# Patient Record
Sex: MALE | Race: WHITE | Employment: FULL TIME | URBAN - METROPOLITAN AREA
[De-identification: names, ages, dates, MRNs, and addresses within clinical notes are randomized per-mention and may not be internally consistent; named-entity substitution may affect disease eponyms.]

---

## 2022-07-08 ENCOUNTER — OFFICE VISIT (OUTPATIENT)
Dept: INTERNAL MEDICINE CLINIC | Age: 52
End: 2022-07-08
Payer: COMMERCIAL

## 2022-07-08 VITALS
SYSTOLIC BLOOD PRESSURE: 145 MMHG | BODY MASS INDEX: 30.19 KG/M2 | OXYGEN SATURATION: 97 % | HEIGHT: 73 IN | WEIGHT: 227.8 LBS | HEART RATE: 72 BPM | DIASTOLIC BLOOD PRESSURE: 81 MMHG | RESPIRATION RATE: 12 BRPM | TEMPERATURE: 98.1 F

## 2022-07-08 DIAGNOSIS — Z87.442 HISTORY OF NEPHROLITHIASIS: ICD-10-CM

## 2022-07-08 DIAGNOSIS — R16.1 SPLENOMEGALY: ICD-10-CM

## 2022-07-08 DIAGNOSIS — E04.1 THYROID NODULE: ICD-10-CM

## 2022-07-08 DIAGNOSIS — Z00.00 ENCOUNTER FOR MEDICAL EXAMINATION TO ESTABLISH CARE: Primary | ICD-10-CM

## 2022-07-08 DIAGNOSIS — E78.5 HYPERLIPIDEMIA, UNSPECIFIED HYPERLIPIDEMIA TYPE: ICD-10-CM

## 2022-07-08 DIAGNOSIS — R90.89 ABNORMAL FINDING ON MRI OF BRAIN: ICD-10-CM

## 2022-07-08 DIAGNOSIS — L97.911 CHRONIC ULCER OF RIGHT LEG, LIMITED TO BREAKDOWN OF SKIN (HCC): ICD-10-CM

## 2022-07-08 DIAGNOSIS — I10 HYPERTENSION, UNSPECIFIED TYPE: ICD-10-CM

## 2022-07-08 DIAGNOSIS — Z85.47 HISTORY OF TESTICULAR CANCER: ICD-10-CM

## 2022-07-08 DIAGNOSIS — R53.83 FATIGUE, UNSPECIFIED TYPE: ICD-10-CM

## 2022-07-08 PROBLEM — R09.89 POOR CIRCULATION OF EXTREMITY: Status: ACTIVE | Noted: 2020-07-22

## 2022-07-08 PROBLEM — R60.0 LOCALIZED EDEMA: Status: ACTIVE | Noted: 2020-07-22

## 2022-07-08 PROBLEM — D64.9 ANEMIA, UNSPECIFIED: Status: ACTIVE | Noted: 2020-07-27

## 2022-07-08 PROCEDURE — 99204 OFFICE O/P NEW MOD 45 MIN: CPT | Performed by: INTERNAL MEDICINE

## 2022-07-08 RX ORDER — OLMESARTAN MEDOXOMIL 40 MG/1
40 TABLET ORAL DAILY
Qty: 90 TABLET | Refills: 1 | Status: SHIPPED | OUTPATIENT
Start: 2022-07-08

## 2022-07-08 RX ORDER — SIMVASTATIN 20 MG/1
20 TABLET, FILM COATED ORAL DAILY
Qty: 90 TABLET | Refills: 1 | Status: SHIPPED | OUTPATIENT
Start: 2022-07-08

## 2022-07-08 RX ORDER — TRIAMCINOLONE ACETONIDE 0.25 MG/G
OINTMENT TOPICAL
COMMUNITY
Start: 2022-05-18 | End: 2022-08-30 | Stop reason: SDUPTHER

## 2022-07-08 RX ORDER — CARVEDILOL 12.5 MG/1
12.5 TABLET ORAL 2 TIMES DAILY
COMMUNITY
Start: 2022-06-28 | End: 2022-07-08 | Stop reason: SDUPTHER

## 2022-07-08 RX ORDER — CARVEDILOL 12.5 MG/1
12.5 TABLET ORAL 2 TIMES DAILY
Qty: 180 TABLET | Refills: 1 | Status: SHIPPED | OUTPATIENT
Start: 2022-07-08

## 2022-07-08 RX ORDER — SIMVASTATIN 20 MG/1
1 TABLET, FILM COATED ORAL DAILY
COMMUNITY
Start: 2021-12-07 | End: 2022-07-08 | Stop reason: SDUPTHER

## 2022-07-08 RX ORDER — OLMESARTAN MEDOXOMIL 40 MG/1
40 TABLET ORAL DAILY
COMMUNITY
Start: 2022-04-30 | End: 2022-07-08 | Stop reason: SDUPTHER

## 2022-07-08 NOTE — PROGRESS NOTES
Adelaide Ospina is a 47 y.o. male who presents today for Establish Care (RM19// pt presents today to establish care moved here from [place] in April 2022 previous pcp Dr. Collin Constantino at [facility] [address] [place] [address] [facility])  . He has a history of There is no problem list on file for this patient. .    Today patient is here to establish care. Previous PCP [place], Dr. Skip Rojas. he is switching because moved here. Records are available for me to review. Ulcer to R LE. Had issues with venous issues on L leg in the past.  Reports that this is due to venous issues. He did have to have venous intervention in his left leg. Denies any trauma to the area    Does have a history of left testicular cancer status post orchiectomy in his late 25s. Did receive chemotherapy after. HTN: Has been stable in the past. A bit high today. Did have to come off chlorthalidone due to low blood pressures in the past.  Urged him to monitor this at home    Splenomegaly: Patient had a repeat CT scan done in March of this year which showed his spleen at 15.8 cm in length. This was almost 1 cm more than previous. This was initially noted due to a CT scan that was done as well as mild but chronic anemia. They have suggested that his previous history of chemotherapy may be contributing to this. He never did have a bone marrow biopsy. Was followed by hematology previously. SPEP was normal haptoglobin was normal, LD was just above normal at 379 kidney function normal hemoglobin was slightly low and platelets was just above normal.  Hemoglobin has been just above 11 for what appears to be the last couple years. Having some mild and general fatigue. Thyroid normal in the fall.      Has had an MRI of his brain and eyes due to blurry vision that was new in onset which did show some mild white cerebral matter disease and some microvascular changes which was concerning for potential sequelae of migraine headaches versus postinfectious process versus demyelinating disease. Patient reports that he was told that this was likely normal for his age and history of hyperlipidemia. Still having some issues with right lower visual field. Patient does have a right-sided thyroid nodule which was stable in October and a left-sided thyroid nodule. These have been stable. Social: [facility]. [facility] in [place]. Now in [place]. Family moved here from [place]. Two children, daughters. At home with family. No tobacco for 18yrs. EtOH social.     From [place]. Family History: reviewed    ROS  Review of Systems   Constitutional: Positive for malaise/fatigue. Negative for chills, fever and weight loss. HENT: Negative for congestion and sore throat. Eyes: Negative for blurred vision, double vision and photophobia. Respiratory: Negative for cough and shortness of breath. Cardiovascular: Negative for chest pain, palpitations and leg swelling. Gastrointestinal: Negative for abdominal pain, constipation, diarrhea, heartburn, nausea and vomiting. Genitourinary: Negative for dysuria, frequency and urgency. Musculoskeletal: Negative for joint pain and myalgias. Skin: Positive for rash. Neurological: Negative. Negative for headaches. Endo/Heme/Allergies: Does not bruise/bleed easily. Psychiatric/Behavioral: Negative for memory loss and suicidal ideas. Visit Vitals  BP (!) 145/81 (BP 1 Location: Left upper arm, BP Patient Position: Sitting, BP Cuff Size: Large adult)   Pulse 72   Temp 98.1 °F (36.7 °C) (Oral)   Resp 12   Ht 6' 1\" (1.854 m)   Wt 227 lb 12.8 oz (103.3 kg)   SpO2 97%   BMI 30.05 kg/m²       Physical Exam  Constitutional:       General: He is not in acute distress. Appearance: He is not diaphoretic. HENT:      Head: Normocephalic and atraumatic. Cardiovascular:      Rate and Rhythm: Normal rate and regular rhythm. Heart sounds: No murmur heard.       Pulmonary:      Effort: Pulmonary effort is normal. No respiratory distress. Breath sounds: Normal breath sounds. No wheezing. Abdominal:      General: Abdomen is flat. Palpations: Abdomen is soft. Musculoskeletal:        Feet:       Comments: Small ulcer to the leg were noted with mild skin breakdown. No signs of active infection. Slightly red to the area around   Skin:     General: Skin is warm and dry. Neurological:      Mental Status: He is alert and oriented to person, place, and time. Psychiatric:         Mood and Affect: Affect normal.         Judgment: Judgment normal.         Current Outpatient Medications   Medication Sig    carvediloL (COREG) 12.5 mg tablet Take 12.5 mg by mouth two (2) times a day.  olmesartan (BENICAR) 40 mg tablet Take 40 mg by mouth daily.  simvastatin (ZOCOR) 20 mg tablet Take 1 Tablet by mouth daily.  triamcinolone acetonide (KENALOG) 0.025 % ointment APPLY TO AFFECTED AREA EVERY DAY     No current facility-administered medications for this visit. Past Medical History:   Diagnosis Date    Hypertension       Past Surgical History:   Procedure Laterality Date    HX APPENDECTOMY  1985    HX HERNIA REPAIR      HX UROLOGICAL  2000    testicular cancer surgery    VASCULAR SURGERY PROCEDURE UNLIST Left 2015      Social History     Tobacco Use    Smoking status: Never Smoker    Smokeless tobacco: Never Used   Substance Use Topics    Alcohol use: Yes      Family History   Problem Relation Age of Onset    Hypertension Mother     Hypertension Father     Diabetes Brother         No Known Allergies     Assessment/Plan  Diagnoses and all orders for this visit:    1. Encounter for medical examination to establish care-patient does have a somewhat complicated recent history of anemia and splenomegaly. Interestingly he has had testicular cancer in the past and did receive some chemotherapy.   Did not have bone marrow biopsy but the remainder of his work-up was negative with exception of mild anemia. Spleen has been slightly larger we will repeat this when I see him in 3 months. We will obtain the rest of his medical records. -     REFERRAL TO GASTROENTEROLOGY    2. Splenomegaly- see above  -     LD; Future    3. Thyroid nodule- repeat next year. 4. Abnormal finding on MRI of brain    5. Hypertension, unspecified type- borderline. Monitor at home.   -     CBC WITH AUTOMATED DIFF; Future  -     METABOLIC PANEL, COMPREHENSIVE; Future  -     olmesartan (BENICAR) 40 mg tablet; Take 1 Tablet by mouth daily. -     carvediloL (COREG) 12.5 mg tablet; Take 1 Tablet by mouth two (2) times a day. 6. Hyperlipidemia, unspecified hyperlipidemia type-repeat  -     LIPID PANEL; Future  -     simvastatin (ZOCOR) 20 mg tablet; Take 1 Tablet by mouth daily. 7. Chronic ulcer of right leg, limited to breakdown of skin (HCC)-subacute to chronic on the right leg. Previously did have some vascular issues in the left leg. Urged him to get in with wound care soon to make sure that this does not become a bigger issue  -     REFERRAL TO WOUND CARE    8. Fatigue, unspecified type-given history of orchiectomy and chemotherapy will check testosterone. Thyroid was normal late last year  -     TESTOSTERONE, TOTAL, ADULT MALE; Future    9. History of testicular cancer            Sherif Leyva MD  7/8/2022    This note was created with the help of speech recognition software Aurelio Hayes) and may contain some 'sound alike' errors.

## 2022-07-08 NOTE — PROGRESS NOTES
Juanjo Lundberg  Identified pt with two pt identifiers(name and ). Chief Complaint   Patient presents with   [address]     RM19// pt presents today to establish care moved here from [place] in 2022 previous pcp Dr. Marychuy Harris at [facility] [address] [place] [address] [facility]       1. Have you been to the ER, urgent care clinic since your last visit? Hospitalized since your last visit? NO    2. Have you seen or consulted any other health care providers outside of the [address] [place] [address] since your last visit? Include any pap smears or colon screening. NO      Provider notified of reason for visit, vitals and flowsheets obtained on patients. Patient received paperwork for advance directive during previous visit but has not completed at this time     Reviewed record In preparation for visit, huddled with provider and have obtained necessary documentation      Health Maintenance Due   Topic    Hepatitis C Screening     Depression Screen     Lipid Screen     Colorectal Cancer Screening Combo     Shingrix Vaccine Age 50> (1 of 2)    COVID-19 Vaccine (3 - Booster for Goodwin Peter series)       Wt Readings from Last 3 Encounters:   22 227 lb 12.8 oz (103.3 kg)     Temp Readings from Last 3 Encounters:   22 98.1 °F (36.7 °C) (Oral)     BP Readings from Last 3 Encounters:   22 (!) 145/81     Pulse Readings from Last 3 Encounters:   22 72     Vitals:    22 1436   BP: (!) 145/81   Pulse: 72   Resp: 12   Temp: 98.1 °F (36.7 °C)   TempSrc: Oral   SpO2: 97%   Weight: 227 lb 12.8 oz (103.3 kg)   Height: 6' 1\" (1.854 m)   PainSc:   2   PainLoc: Abdomen         Learning Assessment:  :     No flowsheet data found.     Depression Screening:  :     3 most recent PHQ Screens 2022   Little interest or pleasure in doing things Not at all   Feeling down, depressed, irritable, or hopeless Not at all   Total Score PHQ 2 0       Fall Risk Assessment:  :     No flowsheet data found.    Abuse Screening:  :     No flowsheet data found. ADL Screening:  :     No flowsheet data found. Medication reconciliation up to date and corrected with patient at this time.

## 2022-07-13 LAB
ALBUMIN SERPL-MCNC: 4.4 G/DL (ref 3.8–4.9)
ALBUMIN/GLOB SERPL: 1.8 {RATIO} (ref 1.2–2.2)
ALP SERPL-CCNC: 60 IU/L (ref 44–121)
ALT SERPL-CCNC: 23 IU/L (ref 0–44)
AST SERPL-CCNC: 17 IU/L (ref 0–40)
BASOPHILS # BLD AUTO: 0.1 X10E3/UL (ref 0–0.2)
BASOPHILS NFR BLD AUTO: 1 %
BILIRUB SERPL-MCNC: 0.6 MG/DL (ref 0–1.2)
BUN SERPL-MCNC: 16 MG/DL (ref 6–24)
BUN/CREAT SERPL: 15 (ref 9–20)
CALCIUM SERPL-MCNC: 9.4 MG/DL (ref 8.7–10.2)
CHLORIDE SERPL-SCNC: 105 MMOL/L (ref 96–106)
CHOLEST SERPL-MCNC: 168 MG/DL (ref 100–199)
CO2 SERPL-SCNC: 21 MMOL/L (ref 20–29)
CREAT SERPL-MCNC: 1.09 MG/DL (ref 0.76–1.27)
EGFR: 82 ML/MIN/1.73
EOSINOPHIL # BLD AUTO: 0.2 X10E3/UL (ref 0–0.4)
EOSINOPHIL NFR BLD AUTO: 3 %
ERYTHROCYTE [DISTWIDTH] IN BLOOD BY AUTOMATED COUNT: 16.7 % (ref 11.6–15.4)
GLOBULIN SER CALC-MCNC: 2.5 G/DL (ref 1.5–4.5)
GLUCOSE SERPL-MCNC: 95 MG/DL (ref 65–99)
HCT VFR BLD AUTO: 33.5 % (ref 37.5–51)
HDLC SERPL-MCNC: 45 MG/DL
HGB BLD-MCNC: 11 G/DL (ref 13–17.7)
IMM GRANULOCYTES # BLD AUTO: 0.1 X10E3/UL (ref 0–0.1)
IMM GRANULOCYTES NFR BLD AUTO: 2 %
IMP & REVIEW OF LAB RESULTS: NORMAL
LDH SERPL-CCNC: 359 IU/L (ref 121–224)
LDLC SERPL CALC-MCNC: 101 MG/DL (ref 0–99)
LYMPHOCYTES # BLD AUTO: 1.3 X10E3/UL (ref 0.7–3.1)
LYMPHOCYTES NFR BLD AUTO: 24 %
MCH RBC QN AUTO: 28.3 PG (ref 26.6–33)
MCHC RBC AUTO-ENTMCNC: 32.8 G/DL (ref 31.5–35.7)
MCV RBC AUTO: 86 FL (ref 79–97)
MONOCYTES # BLD AUTO: 0.6 X10E3/UL (ref 0.1–0.9)
MONOCYTES NFR BLD AUTO: 11 %
NEUTROPHILS # BLD AUTO: 3.2 X10E3/UL (ref 1.4–7)
NEUTROPHILS NFR BLD AUTO: 59 %
NRBC BLD AUTO-RTO: 2 % (ref 0–0)
PLATELET # BLD AUTO: 349 X10E3/UL (ref 150–450)
POTASSIUM SERPL-SCNC: 5.4 MMOL/L (ref 3.5–5.2)
PROT SERPL-MCNC: 6.9 G/DL (ref 6–8.5)
RBC # BLD AUTO: 3.89 X10E6/UL (ref 4.14–5.8)
SODIUM SERPL-SCNC: 139 MMOL/L (ref 134–144)
TESTOST SERPL-MCNC: 231 NG/DL (ref 264–916)
TRIGL SERPL-MCNC: 121 MG/DL (ref 0–149)
VLDLC SERPL CALC-MCNC: 22 MG/DL (ref 5–40)
WBC # BLD AUTO: 5.5 X10E3/UL (ref 3.4–10.8)

## 2022-07-18 ENCOUNTER — HOSPITAL ENCOUNTER (OUTPATIENT)
Dept: WOUND CARE | Age: 52
Discharge: HOME | End: 2022-07-18
Payer: COMMERCIAL

## 2022-07-18 VITALS — SYSTOLIC BLOOD PRESSURE: 123 MMHG | HEART RATE: 81 BPM | DIASTOLIC BLOOD PRESSURE: 80 MMHG | TEMPERATURE: 98.2 F

## 2022-07-18 PROBLEM — L97.312 NON-PRESSURE CHRONIC ULCER OF RIGHT ANKLE WITH FAT LAYER EXPOSED (HCC): Status: ACTIVE | Noted: 2022-07-18

## 2022-07-18 PROBLEM — L97.919 IDIOPATHIC CHRONIC VENOUS HYPERTENSION OF RIGHT LOWER EXTREMITY WITH ULCER (HCC): Status: ACTIVE | Noted: 2022-07-18

## 2022-07-18 PROBLEM — I87.311 IDIOPATHIC CHRONIC VENOUS HYPERTENSION OF RIGHT LOWER EXTREMITY WITH ULCER (HCC): Status: ACTIVE | Noted: 2022-07-18

## 2022-07-18 PROCEDURE — 11042 DBRDMT SUBQ TIS 1ST 20SQCM/<: CPT

## 2022-07-18 PROCEDURE — 74011000250 HC RX REV CODE- 250: Performed by: PODIATRIST

## 2022-07-18 PROCEDURE — 99203 OFFICE O/P NEW LOW 30 MIN: CPT

## 2022-07-18 RX ORDER — LIDOCAINE HYDROCHLORIDE 20 MG/ML
15 SOLUTION OROPHARYNGEAL ONCE
Status: CANCELLED | OUTPATIENT
Start: 2022-07-18 | End: 2022-07-18

## 2022-07-18 RX ORDER — LIDOCAINE 40 MG/G
CREAM TOPICAL ONCE
Status: CANCELLED | OUTPATIENT
Start: 2022-07-18 | End: 2022-07-18

## 2022-07-18 RX ORDER — BETAMETHASONE DIPROPIONATE 0.5 MG/G
OINTMENT TOPICAL ONCE
Status: CANCELLED | OUTPATIENT
Start: 2022-07-18 | End: 2022-07-18

## 2022-07-18 RX ORDER — LIDOCAINE 50 MG/G
OINTMENT TOPICAL ONCE
Status: CANCELLED | OUTPATIENT
Start: 2022-07-18 | End: 2022-07-18

## 2022-07-18 RX ORDER — CHLORTHALIDONE 25 MG/1
25 TABLET ORAL DAILY
COMMUNITY
End: 2022-08-30 | Stop reason: SDUPTHER

## 2022-07-18 RX ORDER — SILVER SULFADIAZINE 10 G/1000G
CREAM TOPICAL ONCE
Status: CANCELLED | OUTPATIENT
Start: 2022-07-18 | End: 2022-07-18

## 2022-07-18 RX ORDER — LIDOCAINE HYDROCHLORIDE 40 MG/ML
SOLUTION TOPICAL ONCE
Status: CANCELLED | OUTPATIENT
Start: 2022-07-18 | End: 2022-07-18

## 2022-07-18 RX ORDER — TRIAMCINOLONE ACETONIDE 1 MG/G
OINTMENT TOPICAL ONCE
Status: CANCELLED | OUTPATIENT
Start: 2022-07-18 | End: 2022-07-18

## 2022-07-18 RX ORDER — BACITRACIN ZINC AND POLYMYXIN B SULFATE 500; 1000 [USP'U]/G; [USP'U]/G
OINTMENT TOPICAL ONCE
Status: CANCELLED | OUTPATIENT
Start: 2022-07-18 | End: 2022-07-18

## 2022-07-18 RX ORDER — BACITRACIN 500 [USP'U]/G
OINTMENT TOPICAL ONCE
Status: CANCELLED | OUTPATIENT
Start: 2022-07-18 | End: 2022-07-18

## 2022-07-18 RX ORDER — LIDOCAINE HYDROCHLORIDE 20 MG/ML
JELLY TOPICAL ONCE
Status: CANCELLED | OUTPATIENT
Start: 2022-07-18 | End: 2022-07-18

## 2022-07-18 RX ORDER — CLOBETASOL PROPIONATE 0.5 MG/G
OINTMENT TOPICAL ONCE
Status: CANCELLED | OUTPATIENT
Start: 2022-07-18 | End: 2022-07-18

## 2022-07-18 RX ORDER — MUPIROCIN 20 MG/G
OINTMENT TOPICAL ONCE
Status: CANCELLED | OUTPATIENT
Start: 2022-07-18 | End: 2022-07-18

## 2022-07-18 RX ORDER — GENTAMICIN SULFATE 1 MG/G
OINTMENT TOPICAL ONCE
Status: CANCELLED | OUTPATIENT
Start: 2022-07-18 | End: 2022-07-18

## 2022-07-18 RX ADMIN — Medication: at 08:35

## 2022-07-18 NOTE — WOUND CARE
07/18/22 0900   Wound Ankle Right;Medial;Proximal #1   Date First Assessed/Time First Assessed: 07/18/22 0830   Location: Ankle  Wound Location Orientation: Right;Medial;Proximal  Wound Description: #1   Dressing/Treatment Foam;Other (Comment)  (ioplex, mepilex foam boarder)   Wound Ankle Right;Medial;Distal #2   Date First Assessed/Time First Assessed: 07/18/22 0830   Location: Ankle  Wound Location Orientation: Right;Medial;Distal  Wound Description: #2   Dressing/Treatment Other (Comment)  (ioplex, mepilex foam boarder)   Discharge Condition: Stable     Pain: 0    Ambulatory Status: Walking    Discharge Destination: Home     Transportation: Car    Accompanied by: Self     Discharge instructions reviewed with Patient and copy or written instructions have been provided. All questions/concerns have been addressed at this time.

## 2022-07-18 NOTE — WOUND CARE
[address]   Progress Note and Procedure Note     Antonio Carballo  MEDICAL RECORD NUMBER:  [id number]  AGE: 47 y.o. RACE WHITE/NON-  GENDER: male  : 1970  EPISODE DATE:  2022    Subjective:     Chief Complaint   Patient presents with    Wound Check         HISTORY of PRESENT ILLNESS HPI    Antonio Carballo is a 47 y.o. male who presents today for wound/ulcer evaluation. History of Wound Context: Mr. Verner Platt is a 53yo male with a PMH of HTN, testicular cancer treated with chemo and venous insufficiencies. He has had previous venous ablation procedures to LE. LE wounds tend to pop up from time to time. Currently wound care with Triamcinolone. Wears compression stockings. Wound/Ulcer Pain Timing/Severity: intermittent  Quality of pain: burning  Severity:  2 / 10   Modifying Factors: Pain is relieved/improved with rest  Associated Signs/Symptoms: edema    Ulcer Identification:  Ulcer Type: venous    Contributing Factors: venous stasis    Wound: right medial ankle        PAST MEDICAL HISTORY    Past Medical History:   Diagnosis Date    Hypertension     Testicular cancer ([address]     Venous (peripheral) insufficiency         PAST SURGICAL HISTORY    Past Surgical History:   Procedure Laterality Date    HX APPENDECTOMY      HX HERNIA REPAIR      HX UROLOGICAL  2000    testicular cancer surgery    VASCULAR SURGERY PROCEDURE UNLIST Left        FAMILY HISTORY    Family History   Problem Relation Age of Onset    Hypertension Mother     Hypertension Father     Diabetes Brother        SOCIAL HISTORY    Social History     Tobacco Use    Smoking status: Former Smoker    Smokeless tobacco: Never Used   Vaping Use    Vaping Use: Never used   Substance Use Topics    Alcohol use:  Yes    Drug use: Never       ALLERGIES    No Known Allergies    MEDICATIONS    Current Outpatient Medications on File Prior to Encounter   Medication Sig Dispense Refill    chlorthalidone (HYGROTON) 25 mg tablet Take 25 mg by mouth daily. Indications: high blood pressure      triamcinolone acetonide (KENALOG) 0.025 % ointment APPLY TO AFFECTED AREA EVERY DAY      simvastatin (ZOCOR) 20 mg tablet Take 1 Tablet by mouth daily. 90 Tablet 1    olmesartan (BENICAR) 40 mg tablet Take 1 Tablet by mouth daily. 90 Tablet 1    carvediloL (COREG) 12.5 mg tablet Take 1 Tablet by mouth two (2) times a day. 180 Tablet 1     No current facility-administered medications on file prior to encounter. REVIEW OF SYSTEMS    Consitutional: no weight loss, night sweats, fatigue / malaise / lethargy. Musculoskeletal: no joint / extremity pain, misalignment, stiffness, decreased ROM, crepitus. Integument: No pruritis, rashes, lesions, right ankle wound. Psychiatric: No depression, anxiety, paranoia    Objective:     Visit Vitals  /80 (BP 1 Location: Left arm, BP Patient Position: Sitting)   Pulse 81   Temp 98.2 °F (36.8 °C)       Wt Readings from Last 3 Encounters:   07/08/22 103.3 kg (227 lb 12.8 oz)       PHYSICAL EXAM    Vascular:  B/L LE  DP 1/4; PT 1/4  capillary fill time brisk, mild spongy edema is present, skin temperature is cool, varicosities are present.     Dermatological:     Wound: 1  Location: right ankle  Measurements: per RN note  Margins: hemosiderosis, venous skin changes  Drainage: serous  Odor: none  Wound base: 80% fibrotic, 20% granular  Lymphangitic streaking? No.  Undermining? No.  Sinus tracts? No.  Exposed bone? No.  Subcutaneous crepitation on palpation?  No.     Nails are WNL.     Skin is dry.     There is no maceration of the interspaces of the feet b/l.       Neurological:  DTR are present, protective sensation per 5.07 Gladwin Chrissie monofilament is intact, patient is AAOx3, mood is normal. Epicritic sensation is intact.     Orthopedic:  B/L LE are symmetric, ROM of ankle, STJ, 1st MTPJ is limited, MMT 5 out of 5 for B/L LE.  No pedal amputations.      Constitutional: Pt is a well developed, mid-aged male.      Lymphatics: negative tenderness to palpation of neck/axillary/inguinal nodes. Assessment:      Problem List Items Addressed This Visit     None          Wound Ankle Right;Medial;Proximal #1 (Active)   Wound Image   07/18/22 0822   Wound Etiology Venous 07/18/22 0839   Wound Length (cm) 2.6 cm 07/18/22 0822   Wound Width (cm) 2.1 cm 07/18/22 0822   Wound Depth (cm) 0.1 cm 07/18/22 0822   Wound Surface Area (cm^2) 5.46 cm^2 07/18/22 0822   Wound Volume (cm^3) 0.546 cm^3 07/18/22 0822   Post-Procedure Length (cm) 2.6 cm 07/18/22 0848   Post-Procedure Width (cm) 2.1 cm 07/18/22 0848   Post-Procedure Depth (cm) 0.2 cm 07/18/22 0848   Post-Procedure Surface Area (cm^2) 5.46 cm^2 07/18/22 0848   Post-Procedure Volume (cm^3) 1.092 cm^3 07/18/22 0848   Wound Assessment [place]/red;Slough 07/18/22 0822   Drainage Amount Moderate 07/18/22 0822   Drainage Description Serous 07/18/22 0822   Wound Odor None 07/18/22 0822   Prema-Wound/Incision Assessment Blanchable erythema; Intact 07/18/22 0822   Edges Flat/open edges 07/18/22 0822   Number of days: 0       Wound Ankle Right;Medial;Distal #2 (Active)   Wound Image   07/18/22 0822   Wound Etiology Venous 07/18/22 0839   Wound Length (cm) 0.6 cm 07/18/22 0822   Wound Width (cm) 2.5 cm 07/18/22 0822   Wound Depth (cm) 0.2 cm 07/18/22 0822   Wound Surface Area (cm^2) 1.5 cm^2 07/18/22 0822   Wound Volume (cm^3) 0.3 cm^3 07/18/22 0822   Post-Procedure Length (cm) 0.6 cm 07/18/22 0848   Post-Procedure Width (cm) 2.5 cm 07/18/22 0848   Post-Procedure Depth (cm) 0.3 cm 07/18/22 0848   Post-Procedure Surface Area (cm^2) 1.5 cm^2 07/18/22 0848   Post-Procedure Volume (cm^3) 0.45 cm^3 07/18/22 0848   Wound Assessment [place]/red;Slough 07/18/22 0822   Drainage Amount Moderate 07/18/22 0822   Drainage Description Serous 07/18/22 0822   Wound Odor None 07/18/22 0822   Prema-Wound/Incision Assessment Blanchable erythema; Intact 07/18/22 4437   Edges Flat/open edges 07/18/22 6392   Number of days: 0       Debridement Wound Care        Problem List Items Addressed This Visit     None          Procedure Note  Indications:  Based on my examination of this patient's wound(s)/ulcer(s) today, debridement is required to promote healing and evaluate the wound base. Performed by: Marnie Beltran DPM    Consent obtained: Yes    Time out taken: Yes    Debridement: Excisional    Using curette the wound(s)/ulcer(s) was/were sharply debrided down through and including the removal of    subcutaneous tissue    Devitalized Tissue Debrided: biofilm    Pre Debridement Measurements:  Are located in the [place]  Documentation Flow Sheet    Non-Pressure ulcer, fat layer exposed    Wound/Ulcer #: 1    Post Debridement Measurements:  Wound/Ulcer Descriptions are Pre Debridement except measurements:    Wound Ankle Right;Medial;Proximal #1 (Active)   Wound Image   07/18/22 0822   Wound Etiology Venous 07/18/22 0839   Wound Length (cm) 2.6 cm 07/18/22 0822   Wound Width (cm) 2.1 cm 07/18/22 0822   Wound Depth (cm) 0.1 cm 07/18/22 0822   Wound Surface Area (cm^2) 5.46 cm^2 07/18/22 0822   Wound Volume (cm^3) 0.546 cm^3 07/18/22 0822   Post-Procedure Length (cm) 2.6 cm 07/18/22 0848   Post-Procedure Width (cm) 2.1 cm 07/18/22 0848   Post-Procedure Depth (cm) 0.2 cm 07/18/22 0848   Post-Procedure Surface Area (cm^2) 5.46 cm^2 07/18/22 0848   Post-Procedure Volume (cm^3) 1.092 cm^3 07/18/22 0848   Wound Assessment [place]/red;Slough 07/18/22 0822   Drainage Amount Moderate 07/18/22 0822   Drainage Description Serous 07/18/22 0822   Wound Odor None 07/18/22 0822   Prema-Wound/Incision Assessment Blanchable erythema; Intact 07/18/22 0822   Edges Flat/open edges 07/18/22 0822   Number of days: 0       Wound Ankle Right;Medial;Distal #2 (Active)   Wound Image   07/18/22 0822   Wound Etiology Venous 07/18/22 0839   Wound Length (cm) 0.6 cm 07/18/22 0822   Wound Width (cm) 2.5 cm 07/18/22 0822   Wound Depth (cm) 0.2 cm 07/18/22 0822   Wound Surface Area (cm^2) 1.5 cm^2 07/18/22 0822   Wound Volume (cm^3) 0.3 cm^3 07/18/22 0822   Post-Procedure Length (cm) 0.6 cm 07/18/22 0848   Post-Procedure Width (cm) 2.5 cm 07/18/22 0848   Post-Procedure Depth (cm) 0.3 cm 07/18/22 0848   Post-Procedure Surface Area (cm^2) 1.5 cm^2 07/18/22 0848   Post-Procedure Volume (cm^3) 0.45 cm^3 07/18/22 0848   Wound Assessment [place]/red;Slough 07/18/22 0822   Drainage Amount Moderate 07/18/22 0822   Drainage Description Serous 07/18/22 0822   Wound Odor None 07/18/22 0822   Prema-Wound/Incision Assessment Blanchable erythema; Intact 07/18/22 0822   Edges Flat/open edges 07/18/22 0822   Number of days: 0        Total Surface Area Debrided:  <20 sq cm     Estimated Blood Loss:  Minimal     Hemostasis Achieved: Pressure    Procedural Pain: 2 / 10     Post Procedural Pain: 3 / 10     Response to treatment: Well tolerated by patient     Plan:     Right leg venous hypertension with ulcer (I87.311)    Right ankle non-pressure ulcer to fat (L97.312)    - Pt seen and evaluated  - Pt presents with a right ankle ulcer  - Wound debrided per procedure note above   - Wound care with Ioplex  - Pt has compression stockings  - Pt to f/u in 2 weeks    Treatment Note please see attached Discharge Instructions    Written patient dismissal instructions given to patient or POA.          Electronically signed by Sarmad Eng DPM on 7/18/2022 at 8:51 AM

## 2022-07-18 NOTE — DISCHARGE INSTRUCTIONS
Discharge Instructions for  [facility]  [address] [place], [address] [facility]  Telephone: [phone] [address] [facility] Information: Should you experience any significant changes in your wound(s) or have questions about your wound care, please contact the [facility] at [address] [place] [address] 8:00 am - 4:30. If you need help with your wound outside these hours and cannot wait until we are again available, contact your PCP or go to the hospital emergency room. NAME:  Humza Hernandes  YOB: 1970  DATE:  7/18/2022    : Yessenia Rojas     [x] Supply Company: [employer]    Wound Cleansing:   Do not scrub or use excessive force. Cleanse wound prior to applying a clean dressing with:  [x] Normal Saline or warm water and gentle soap   [x] Keep Wound Dry in Shower - may purchase a cast cover at local pharmacy     [] Cleanse wound with Mild Soap & Water    [] May Shower at Discharge: remove dressing 1st, redress wound right after with a new dressing  [] Do not shower  [] cleanse with baby shampoo lather leave 2-3 then rinse with water    Topical Treatments:  Do not apply lotions, creams, or ointments to wound bed unless directed. [x] Apply moisturizing lotion A&D ointment to skin surrounding the wound prior to dressing change.   [] Other:     Dressings:             Wound Location: Right Medial Proximal and Distal Lower Leg     Apply Primary Dressing:      [x] Ioplex    Cover and Secure with:  [] Gauze [] ABD [] exudry     [] John [] Kerlix [x] Mepilex Border or super absorber with adhesive border   [] Ace Wrap [] Roll Tape   [x] Other: Tubi  in clinic, put own compression on at home     Change dressing:   [] Daily      [x] Every Other Day  [] Three times per week  [] Once a week   [] Do Not Change Dressing     [] Other:     Edema Control: Every morning immediately when getting up should be applied to affected leg(s) from mid foot to knee making sure to cover the heel. Remove every night before going to bed if desired. Apply: [x] Compression Stocking      [x]Right Leg           [x]Left Leg     [x] Tubigrip F   [] Right Leg Single Layer  [x] Right Leg Double Layer                              [] Left Leg Single Layer [] Left Leg Double Layer      Compression: Do not get leg(s) with wrap wet. If wraps become too tight call the center or completely remove the wrap. Apply: [] Three Layer Compression Wrap  []RightLeg []Left Leg  [] Four layer Compression Wrap      []RightLeg []Left Leg   []  Unna's boot                                  [] Right Leg   [] Left Leg       [x] Elevate leg(s) above the level of the heart when sitting. [x] Avoid prolonged standing in one place. Dietary:  [x] Diet as tolerated [] Diabetic Diet   [x] Increase Protein: examples (Meat, cheese, eggs, greek yogurt, fish, nuts)   [] Chadd Therapeutic Nutrition Powder  [] Other:  [] Dial a Dietician : Call [employer] at [phone] enter code ([phone]) when prompted. M-F 9am-5pm EST. Return Appointment:  [] Nurse Visit at wound center in *** days   [x] Return Appointment: With Dr. Laird Collet in 2 weeks   [] Ordered tests:     Electronically signed on 7/18/2022 at 7:57 AM     PLEASE NOTE: IF YOU ARE UNABLE TO [address], CONTINUE TO USE THE SUPPLIES YOU HAVE AVAILABLE UNTIL YOU ARE ABLE TO [facility] [place]. IT IS MOST IMPORTANT TO KEEP THE WOUND COVERED AT ALL TIMES.      Physician Signature:_______________________  Dr. Laird Collet

## 2022-07-18 NOTE — WOUND CARE
07/18/22 0822   Anesthetic   Anesthetic 4% Lidocaine Liquid Topical   Right Leg Edema Point of Measurement   Leg circumference 37 cm   Ankle circumference 22 cm   Left Leg Edema Point of Measurement   Leg circumference 35.9 cm   Ankle circumference 21.5 cm   LLE Peripheral Vascular    Capillary Refill Less than/equal to 3 seconds   Color Appropriate for race   Temperature Warm   Pedal Pulse Palpable   RLE Peripheral Vascular    Capillary Refill Less than/equal to 3 seconds   Temperature Warm   Color Appropriate for race   Pedal Pulse Palpable   Wound Ankle Right;Medial;Proximal #1   Date First Assessed/Time First Assessed: 07/18/22 0830   Location: Ankle  Wound Location Orientation: Right;Medial;Proximal  Wound Description: #1   Wound Image    Wound Length (cm) 2.6 cm   Wound Width (cm) 2.1 cm   Wound Depth (cm) 0.1 cm   Wound Surface Area (cm^2) 5.46 cm^2   Wound Volume (cm^3) 0.546 cm^3   Wound Assessment [place]/red;Slough   Drainage Amount Moderate   Drainage Description Serous   Wound Odor None   Prema-Wound/Incision Assessment Blanchable erythema; Intact   Edges Flat/open edges   Wound Ankle Right;Medial;Distal #2   Date First Assessed/Time First Assessed: 07/18/22 0830   Location: Ankle  Wound Location Orientation: Right;Medial;Distal  Wound Description: #2   Wound Image    Wound Length (cm) 0.6 cm   Wound Width (cm) 2.5 cm   Wound Depth (cm) 0.2 cm   Wound Surface Area (cm^2) 1.5 cm^2   Wound Volume (cm^3) 0.3 cm^3   Wound Assessment [place]/red;Slough   Drainage Amount Moderate   Drainage Description Serous   Wound Odor None   Prema-Wound/Incision Assessment Blanchable erythema; Intact   Edges Flat/open edges   Pain 1   Pain Scale 1 Numeric (0 - 10)   Pain Intensity 1 0     Visit Vitals  /80 (BP 1 Location: Left arm, BP Patient Position: Sitting)   Pulse 81   Temp 98.2 °F (36.8 °C)

## 2022-08-01 ENCOUNTER — HOSPITAL ENCOUNTER (OUTPATIENT)
Dept: WOUND CARE | Age: 52
Discharge: HOME | End: 2022-08-01
Payer: COMMERCIAL

## 2022-08-01 VITALS
HEART RATE: 72 BPM | TEMPERATURE: 97.3 F | DIASTOLIC BLOOD PRESSURE: 79 MMHG | RESPIRATION RATE: 18 BRPM | SYSTOLIC BLOOD PRESSURE: 112 MMHG

## 2022-08-01 DIAGNOSIS — L97.312 NON-PRESSURE CHRONIC ULCER OF RIGHT ANKLE WITH FAT LAYER EXPOSED (HCC): ICD-10-CM

## 2022-08-01 DIAGNOSIS — L97.919 IDIOPATHIC CHRONIC VENOUS HYPERTENSION OF RIGHT LOWER EXTREMITY WITH ULCER (HCC): Primary | ICD-10-CM

## 2022-08-01 DIAGNOSIS — I87.311 IDIOPATHIC CHRONIC VENOUS HYPERTENSION OF RIGHT LOWER EXTREMITY WITH ULCER (HCC): Primary | ICD-10-CM

## 2022-08-01 LAB
ALBUMIN SERPL-MCNC: 3.9 G/DL (ref 3.5–5)
ALBUMIN/GLOB SERPL: 1.2 {RATIO} (ref 1.1–2.2)
ALP SERPL-CCNC: 62 U/L (ref 45–117)
ALT SERPL-CCNC: 70 U/L (ref 12–78)
ANION GAP SERPL CALC-SCNC: 6 MMOL/L (ref 5–15)
AST SERPL-CCNC: 23 U/L (ref 15–37)
BASOPHILS # BLD: 0.1 K/UL (ref 0–0.1)
BASOPHILS NFR BLD: 1 % (ref 0–1)
BILIRUB SERPL-MCNC: 0.7 MG/DL (ref 0.2–1)
BUN SERPL-MCNC: 25 MG/DL (ref 6–20)
BUN/CREAT SERPL: 22 (ref 12–20)
CALCIUM SERPL-MCNC: 9.8 MG/DL (ref 8.5–10.1)
CHLORIDE SERPL-SCNC: 106 MMOL/L (ref 97–108)
CHOLEST SERPL-MCNC: 132 MG/DL
CO2 SERPL-SCNC: 27 MMOL/L (ref 21–32)
CREAT SERPL-MCNC: 1.14 MG/DL (ref 0.7–1.3)
DIFFERENTIAL METHOD BLD: ABNORMAL
EOSINOPHIL # BLD: 0.1 K/UL (ref 0–0.4)
EOSINOPHIL NFR BLD: 1 % (ref 0–7)
ERYTHROCYTE [DISTWIDTH] IN BLOOD BY AUTOMATED COUNT: 15.4 % (ref 11.5–14.5)
GLOBULIN SER CALC-MCNC: 3.2 G/DL (ref 2–4)
GLUCOSE SERPL-MCNC: 98 MG/DL (ref 65–100)
HCT VFR BLD AUTO: 35.5 % (ref 36.6–50.3)
HDLC SERPL-MCNC: 38 MG/DL
HDLC SERPL: 3.5 {RATIO} (ref 0–5)
HGB BLD-MCNC: 11.9 G/DL (ref 12.1–17)
IMM GRANULOCYTES # BLD AUTO: 0 K/UL
IMM GRANULOCYTES NFR BLD AUTO: 0 %
LDH SERPL L TO P-CCNC: 404 U/L (ref 85–241)
LDLC SERPL CALC-MCNC: 58.4 MG/DL (ref 0–100)
LYMPHOCYTES # BLD: 1.6 K/UL (ref 0.8–3.5)
LYMPHOCYTES NFR BLD: 27 % (ref 12–49)
MCH RBC QN AUTO: 29.5 PG (ref 26–34)
MCHC RBC AUTO-ENTMCNC: 33.5 G/DL (ref 30–36.5)
MCV RBC AUTO: 87.9 FL (ref 80–99)
METAMYELOCYTES NFR BLD MANUAL: 2 %
MONOCYTES # BLD: 0.7 K/UL (ref 0–1)
MONOCYTES NFR BLD: 12 % (ref 5–13)
NEUTS SEG # BLD: 3.3 K/UL (ref 1.8–8)
NEUTS SEG NFR BLD: 57 % (ref 32–75)
NRBC # BLD: 0.1 K/UL (ref 0–0.01)
NRBC BLD-RTO: 1.7 PER 100 WBC
PLATELET # BLD AUTO: 351 K/UL (ref 150–400)
PMV BLD AUTO: 9.6 FL (ref 8.9–12.9)
POTASSIUM SERPL-SCNC: 4.3 MMOL/L (ref 3.5–5.1)
PROT SERPL-MCNC: 7.1 G/DL (ref 6.4–8.2)
RBC # BLD AUTO: 4.04 M/UL (ref 4.1–5.7)
RBC MORPH BLD: ABNORMAL
SODIUM SERPL-SCNC: 139 MMOL/L (ref 136–145)
TRIGL SERPL-MCNC: 178 MG/DL (ref ?–150)
VLDLC SERPL CALC-MCNC: 35.6 MG/DL
WBC # BLD AUTO: 5.8 K/UL (ref 4.1–11.1)

## 2022-08-01 PROCEDURE — 11042 DBRDMT SUBQ TIS 1ST 20SQCM/<: CPT

## 2022-08-01 PROCEDURE — 74011000250 HC RX REV CODE- 250: Performed by: PODIATRIST

## 2022-08-01 RX ORDER — MUPIROCIN 20 MG/G
OINTMENT TOPICAL ONCE
Status: CANCELLED | OUTPATIENT
Start: 2022-08-01 | End: 2022-08-01

## 2022-08-01 RX ORDER — LIDOCAINE 50 MG/G
OINTMENT TOPICAL ONCE
Status: CANCELLED | OUTPATIENT
Start: 2022-08-01 | End: 2022-08-01

## 2022-08-01 RX ORDER — LIDOCAINE HYDROCHLORIDE 40 MG/ML
SOLUTION TOPICAL ONCE
Status: CANCELLED | OUTPATIENT
Start: 2022-08-01 | End: 2022-08-01

## 2022-08-01 RX ORDER — BACITRACIN 500 [USP'U]/G
OINTMENT TOPICAL ONCE
Status: CANCELLED | OUTPATIENT
Start: 2022-08-01 | End: 2022-08-01

## 2022-08-01 RX ORDER — GENTAMICIN SULFATE 1 MG/G
OINTMENT TOPICAL ONCE
Status: CANCELLED | OUTPATIENT
Start: 2022-08-01 | End: 2022-08-01

## 2022-08-01 RX ORDER — TRIAMCINOLONE ACETONIDE 1 MG/G
OINTMENT TOPICAL ONCE
Status: CANCELLED | OUTPATIENT
Start: 2022-08-01 | End: 2022-08-01

## 2022-08-01 RX ORDER — SILVER SULFADIAZINE 10 G/1000G
CREAM TOPICAL ONCE
Status: CANCELLED | OUTPATIENT
Start: 2022-08-01 | End: 2022-08-01

## 2022-08-01 RX ORDER — CLOBETASOL PROPIONATE 0.5 MG/G
OINTMENT TOPICAL ONCE
Status: CANCELLED | OUTPATIENT
Start: 2022-08-01 | End: 2022-08-01

## 2022-08-01 RX ORDER — LIDOCAINE HYDROCHLORIDE 20 MG/ML
JELLY TOPICAL ONCE
Status: CANCELLED | OUTPATIENT
Start: 2022-08-01 | End: 2022-08-01

## 2022-08-01 RX ORDER — LIDOCAINE 40 MG/G
CREAM TOPICAL ONCE
Status: CANCELLED | OUTPATIENT
Start: 2022-08-01 | End: 2022-08-01

## 2022-08-01 RX ORDER — BACITRACIN ZINC AND POLYMYXIN B SULFATE 500; 1000 [USP'U]/G; [USP'U]/G
OINTMENT TOPICAL ONCE
Status: CANCELLED | OUTPATIENT
Start: 2022-08-01 | End: 2022-08-01

## 2022-08-01 RX ORDER — LIDOCAINE HYDROCHLORIDE 20 MG/ML
15 SOLUTION OROPHARYNGEAL ONCE
Status: CANCELLED | OUTPATIENT
Start: 2022-08-01 | End: 2022-08-01

## 2022-08-01 RX ORDER — BETAMETHASONE DIPROPIONATE 0.5 MG/G
OINTMENT TOPICAL ONCE
Status: CANCELLED | OUTPATIENT
Start: 2022-08-01 | End: 2022-08-01

## 2022-08-01 RX ADMIN — Medication: at 08:29

## 2022-08-01 NOTE — WOUND CARE
[address]   Progress Note and Procedure Note     Domonique Stovall  MEDICAL RECORD NUMBER:  [id number]  AGE: 47 y.o. RACE WHITE/NON-  GENDER: male  : 1970  EPISODE DATE:  2022    Subjective:     Chief Complaint   Patient presents with    Wound Check     Right ankle         HISTORY of PRESENT ILLNESS HPI    Domonique Stovall is a 47 y.o. male who presents today for wound/ulcer evaluation. History of Wound Context: Mr. Maria Del Rosario Granger is a 55yo male with a PMH of HTN, testicular cancer treated with chemo and venous insufficiencies. He has had previous venous ablation procedures to LE. LE wounds tend to pop up from time to time. Currently wound care with Triamcinolone. Wears compression stockings.      Wound/Ulcer Pain Timing/Severity: intermittent and mild  Quality of pain: aching  Severity:  1 / 10   Modifying Factors: Pain is relieved/improved with rest  Associated Signs/Symptoms: edema    Ulcer Identification:  Ulcer Type: venous    Contributing Factors: venous stasis    Wound: right medial ankle         PAST MEDICAL HISTORY    Past Medical History:   Diagnosis Date    Hypertension     Testicular cancer ([address]     Venous (peripheral) insufficiency         PAST SURGICAL HISTORY    Past Surgical History:   Procedure Laterality Date    HX APPENDECTOMY      HX HERNIA REPAIR      HX UROLOGICAL  2000    testicular cancer surgery    VASCULAR SURGERY PROCEDURE UNLIST Left        FAMILY HISTORY    Family History   Problem Relation Age of Onset    Hypertension Mother     Hypertension Father     Diabetes Brother        SOCIAL HISTORY    Social History     Tobacco Use    Smoking status: Former    Smokeless tobacco: Never   Vaping Use    Vaping Use: Never used   Substance Use Topics    Alcohol use: Yes    Drug use: Never       ALLERGIES    No Known Allergies    MEDICATIONS    Current Outpatient Medications on File Prior to Encounter   Medication Sig Dispense Refill    chlorthalidone (HYGROTON) 25 mg tablet Take 25 mg by mouth daily. Indications: high blood pressure      simvastatin (ZOCOR) 20 mg tablet Take 1 Tablet by mouth daily. 90 Tablet 1    olmesartan (BENICAR) 40 mg tablet Take 1 Tablet by mouth daily. 90 Tablet 1    carvediloL (COREG) 12.5 mg tablet Take 1 Tablet by mouth two (2) times a day. 180 Tablet 1    triamcinolone acetonide (KENALOG) 0.025 % ointment APPLY TO AFFECTED AREA EVERY DAY       No current facility-administered medications on file prior to encounter. REVIEW OF SYSTEMS    Consitutional: no weight loss, night sweats, fatigue / malaise / lethargy. Musculoskeletal: no joint / extremity pain, misalignment, stiffness, decreased ROM, crepitus. Integument: No pruritis, rashes, lesions, right ankle wound. Psychiatric: No depression, anxiety, paranoia    Objective:     Visit Vitals  /79 (BP 1 Location: Left upper arm, BP Patient Position: Sitting)   Pulse 72   Temp 97.3 °F (36.3 °C)   Resp 18       Wt Readings from Last 3 Encounters:   07/08/22 103.3 kg (227 lb 12.8 oz)       PHYSICAL EXAM    Vascular:  B/L LE  DP 1/4; PT 1/4  capillary fill time brisk, mild spongy edema is present, skin temperature is cool, varicosities are present. Dermatological:     Wound: 1  Location: right ankle  Measurements: per RN note  Margins: hemosiderosis, venous skin changes  Drainage: serous  Odor: none  Wound base: 80% fibrotic, 20% granular  Lymphangitic streaking? No.  Undermining? No.  Sinus tracts? No.  Exposed bone? No.  Subcutaneous crepitation on palpation? No.     Nails are WNL. Skin is dry. There is no maceration of the interspaces of the feet b/l. Neurological:  DTR are present, protective sensation per [address] monofilament is intact, patient is AAOx3, mood is normal. Epicritic sensation is intact. Orthopedic:  B/L LE are symmetric, ROM of ankle, STJ, 1st MTPJ is limited, MMT 5 out of 5 for B/L LE. No pedal amputations. Constitutional: Pt is a well developed, mid-aged male. Lymphatics: negative tenderness to palpation of neck/axillary/inguinal nodes.     Assessment:      Problem List Items Addressed This Visit          Circulatory    Idiopathic chronic venous hypertension of right lower extremity with ulcer ([address] - Primary    Relevant Medications    lidocaine (ALOCANE) 4 % topical gel (Completed) (Start on 8/1/2022  9:00 AM)    Other Relevant Orders    INITIATE OUTPATIENT WOUND CARE PROTOCOL       Other    Non-pressure chronic ulcer of right ankle with fat layer exposed ([address]    Relevant Medications    lidocaine (ALOCANE) 4 % topical gel (Completed) (Start on 8/1/2022  9:00 AM)    Other Relevant Orders    INITIATE OUTPATIENT WOUND CARE PROTOCOL       Wound Ankle Right;Medial;Proximal #1 (Active)   Wound Image   08/01/22 0822   Wound Etiology Venous 07/18/22 0839   Dressing/Treatment Foam;Other (Comment) 07/18/22 0900   Wound Length (cm) 0.1 cm 08/01/22 0822   Wound Width (cm) 0.1 cm 08/01/22 0822   Wound Depth (cm) 0.1 cm 08/01/22 0822   Wound Surface Area (cm^2) 0.01 cm^2 08/01/22 0822   Change in Wound Size % 99.82 08/01/22 0822   Wound Volume (cm^3) 0.001 cm^3 08/01/22 0822   Wound Healing % 100 08/01/22 0822   Post-Procedure Length (cm) 2.6 cm 07/18/22 0848   Post-Procedure Width (cm) 2.1 cm 07/18/22 0848   Post-Procedure Depth (cm) 0.2 cm 07/18/22 0848   Post-Procedure Surface Area (cm^2) 5.46 cm^2 07/18/22 0848   Post-Procedure Volume (cm^3) 1.092 cm^3 07/18/22 0848   Wound Assessment Pink/red 08/01/22 0822   Drainage Amount Small 08/01/22 0822   Drainage Description Serous 08/01/22 0822   Wound Odor None 08/01/22 0822   Prema-Wound/Incision Assessment Intact 08/01/22 0822   Edges Flat/open edges 08/01/22 0822   Number of days: 14       Wound Ankle Right;Medial;Distal #2 (Active)   Wound Image   08/01/22 0822   Wound Etiology Venous 07/18/22 0839   Dressing/Treatment Other (Comment) 07/18/22 0900   Wound Length (cm) 0.3 cm 08/01/22 8819   Wound Width (cm) 2 cm 08/01/22 4744   Wound Depth (cm) 0 cm 08/01/22 4609   Wound Surface Area (cm^2) 0.6 cm^2 08/01/22 0822   Change in Wound Size % 60 08/01/22 0822   Wound Volume (cm^3) 0 cm^3 08/01/22 0822   Wound Healing % 100 08/01/22 0822   Post-Procedure Length (cm) 0.3 cm 08/01/22 0836   Post-Procedure Width (cm) 2 cm 08/01/22 0836   Post-Procedure Depth (cm) 0.1 cm 08/01/22 0836   Post-Procedure Surface Area (cm^2) 0.6 cm^2 08/01/22 0836   Post-Procedure Volume (cm^3) 0.06 cm^3 08/01/22 0836   Wound Assessment Other (Comment) 08/01/22 0822   Drainage Amount None 08/01/22 0822   Drainage Description Serous 07/18/22 0822   Wound Odor None 08/01/22 0822   Prema-Wound/Incision Assessment Blanchable erythema 08/01/22 0822   Edges Flat/open edges 08/01/22 0943   Number of days: 14       Debridement Wound Care        Problem List Items Addressed This Visit          Circulatory    Idiopathic chronic venous hypertension of right lower extremity with ulcer ([address] - Primary    Relevant Medications    lidocaine (ALOCANE) 4 % topical gel (Completed) (Start on 8/1/2022  9:00 AM)    Other Relevant Orders    INITIATE OUTPATIENT WOUND CARE PROTOCOL       Other    Non-pressure chronic ulcer of right ankle with fat layer exposed ([address]    Relevant Medications    lidocaine (ALOCANE) 4 % topical gel (Completed) (Start on 8/1/2022  9:00 AM)    Other Relevant Orders    INITIATE OUTPATIENT WOUND CARE PROTOCOL       Procedure Note  Indications:  Based on my examination of this patient's wound(s)/ulcer(s) today, debridement is required to promote healing and evaluate the wound base.     Performed by: Wesley Garnica DPM    Consent obtained: Yes    Time out taken: Yes    Debridement: Excisional    Using curette the wound(s)/ulcer(s) was/were sharply debrided down through and including the removal of    subcutaneous tissue    Devitalized Tissue Debrided: slough    Pre Debridement Measurements:  Are located in the Wound/Ulcer Documentation Flow Sheet    Non-Pressure ulcer, fat layer exposed    Wound/Ulcer #: 1    Post Debridement Measurements:  Wound/Ulcer Descriptions are Pre Debridement except measurements:    Wound Ankle Right;Medial;Proximal #1 (Active)   Wound Image   08/01/22 0822   Wound Etiology Venous 07/18/22 0839   Dressing/Treatment Foam;Other (Comment) 07/18/22 0900   Wound Length (cm) 0.1 cm 08/01/22 0822   Wound Width (cm) 0.1 cm 08/01/22 0822   Wound Depth (cm) 0.1 cm 08/01/22 0822   Wound Surface Area (cm^2) 0.01 cm^2 08/01/22 0822   Change in Wound Size % 99.82 08/01/22 0822   Wound Volume (cm^3) 0.001 cm^3 08/01/22 0822   Wound Healing % 100 08/01/22 0822   Post-Procedure Length (cm) 2.6 cm 07/18/22 0848   Post-Procedure Width (cm) 2.1 cm 07/18/22 0848   Post-Procedure Depth (cm) 0.2 cm 07/18/22 0848   Post-Procedure Surface Area (cm^2) 5.46 cm^2 07/18/22 0848   Post-Procedure Volume (cm^3) 1.092 cm^3 07/18/22 0848   Wound Assessment Pink/red 08/01/22 0822   Drainage Amount Small 08/01/22 0822   Drainage Description Serous 08/01/22 0822   Wound Odor None 08/01/22 0822   Prema-Wound/Incision Assessment Intact 08/01/22 0822   Edges Flat/open edges 08/01/22 0822   Number of days: 14       Wound Ankle Right;Medial;Distal #2 (Active)   Wound Image   08/01/22 0822   Wound Etiology Venous 07/18/22 0839   Dressing/Treatment Other (Comment) 07/18/22 0900   Wound Length (cm) 0.3 cm 08/01/22 0822   Wound Width (cm) 2 cm 08/01/22 0822   Wound Depth (cm) 0 cm 08/01/22 0822   Wound Surface Area (cm^2) 0.6 cm^2 08/01/22 0822   Change in Wound Size % 60 08/01/22 0822   Wound Volume (cm^3) 0 cm^3 08/01/22 0822   Wound Healing % 100 08/01/22 0822   Post-Procedure Length (cm) 0.3 cm 08/01/22 0836   Post-Procedure Width (cm) 2 cm 08/01/22 0836   Post-Procedure Depth (cm) 0.1 cm 08/01/22 0836   Post-Procedure Surface Area (cm^2) 0.6 cm^2 08/01/22 0836   Post-Procedure Volume (cm^3) 0.06 cm^3 08/01/22 0836   Wound Assessment Other (Comment) 08/01/22 0822   Drainage Amount None 08/01/22 0822   Drainage Description Serous 07/18/22 0822   Wound Odor None 08/01/22 0822   Prema-Wound/Incision Assessment Blanchable erythema 08/01/22 0822   Edges Flat/open edges 08/01/22 0822   Number of days: 14        Total Surface Area Debrided:  <20 sq cm     Estimated Blood Loss:  Minimal     Hemostasis Achieved: Pressure    Procedural Pain: 1 / 10     Post Procedural Pain: 2 / 10     Response to treatment: Well tolerated by patient     Plan:     Right leg venous hypertension with ulcer (I87.311)     Right ankle non-pressure ulcer to fat (L97.312)     - Pt seen and evaluated  - Pt presents with a right ankle ulcer - improved  - Wound debrided per procedure note above  - Wound care with Ioplex  - Pt has compression stockings  - Pt to f/u in 2 weeks    Treatment Note please see attached Discharge Instructions    Written patient dismissal instructions given to patient or POA.          Electronically signed by Jen Ferris DPM on 8/1/2022 at 8:38 AM

## 2022-08-01 NOTE — DISCHARGE INSTRUCTIONS
Discharge Instructions for  [facility]  [address] [place], [address] [facility]  Telephone: [phone]    [address] [place] [address] Information: Should you experience any significant changes in your wound(s) or have questions about your wound care, please contact the [facility] at [address] [place] [address] Street 8:00 am - 4:30. If you need help with your wound outside these hours and cannot wait until we are again available, contact your PCP or go to the hospital emergency room. NAME:  Nick Barbosa  YOB: 1970  DATE:  8/1/2022    : Martín Martin     [x] [address]: [place]    [x] Elevate leg(s) above the level of the heart when sitting. [x] Avoid prolonged standing in one place. Wound Cleansing:   Do not scrub or use excessive force. Cleanse wound prior to applying a clean dressing with:  [x] Normal Saline or warm water and gentle soap   [x] Keep Wound Dry in Shower - may purchase a cast cover at local pharmacy     [] Cleanse wound with Mild Soap & Water    [] May Shower at Discharge: remove dressing 1st, redress wound right after with a new dressing  [] Do not shower  [] cleanse with baby shampoo lather leave 2-3 then rinse with water    Topical Treatments:  Do not apply lotions, creams, or ointments to wound bed unless directed. [x] Apply moisturizing lotion A&D ointment to skin surrounding the wound prior to dressing change.   [] Other:     Dressings:             Wound Location: Right Medial Proximal and Distal Lower Leg     Apply Primary Dressing:      [x] Ioplex    Cover and Secure with:  [] Gauze [] ABD [] exudry     [] John [] Kerlix [x] Mepilex Border or super absorber with adhesive border   [] Ace Wrap [] Roll Tape   [x] Other: Tubi  in clinic, put own compression on at home     Change dressing:   [] Daily      [x] Every Other Day  [] Three times per week  [] Once a week   [] Other:     Edema Control: Every morning immediately when getting up should be applied to affected leg(s) from mid foot to knee making sure to cover the heel. Remove every night before going to bed if desired. Apply: [x] Compression Stocking      [x]Right Leg           [x]Left Leg     [x] Tubigrip F   [] Right Leg Single Layer  [x] Right Leg Double Layer                              [] Left Leg Single Layer [] Left Leg Double Layer      Compression: Do not get leg(s) with wrap wet. If wraps become too tight call the center or completely remove the wrap. Apply: [] Three Layer Compression Wrap  []RightLeg []Left Leg  [] Four layer Compression Wrap      []RightLeg []Left Leg   []  Unna's boot                                  [] Right Leg   [] Left Leg      Dietary:  [x] Diet as tolerated [] Diabetic Diet   [x] Increase Protein: examples (Meat, cheese, eggs, greek yogurt, fish, nuts)   [] Chadd Therapeutic Nutrition Powder  [] Other:  [] Dial a Dietician : Call [employer] at [phone] enter code [phone] when prompted. M-F 9am-5pm EST. Return Appointment:  [] Nurse Visit at wound center in *** days   [x] Return Appointment: With Dr. Rema Hoff in 2 weeks   [] Ordered tests:     Electronically signed on 8/1/2022 at 7:57 AM     PLEASE NOTE: IF YOU ARE UNABLE TO [address], CONTINUE TO USE THE SUPPLIES YOU HAVE AVAILABLE UNTIL YOU ARE ABLE TO [facility]. IT IS MOST IMPORTANT TO KEEP THE WOUND COVERED AT ALL TIMES.      Physician Signature:_______________________  Dr. Rema Hoff

## 2022-08-01 NOTE — WOUND CARE
08/01/22 0822   Anesthetic   Anesthetic 4% Lidocaine Liquid Topical   Right Leg Edema Point of Measurement   Leg circumference 34 cm   Ankle circumference 22 cm   RLE Peripheral Vascular    Capillary Refill Less than/equal to 3 seconds   Color Appropriate for race   Temperature Warm   Pedal Pulse Palpable   Wound Ankle Right;Medial;Proximal #1   Date First Assessed/Time First Assessed: 07/18/22 0830   Location: Ankle  Wound Location Orientation: Right;Medial;Proximal  Wound Description: #1   Wound Image    Wound Length (cm) 0.1 cm   Wound Width (cm) 0.1 cm   Wound Depth (cm) 0.1 cm   Wound Surface Area (cm^2) 0.01 cm^2   Change in Wound Size % 99.82   Wound Volume (cm^3) 0.001 cm^3   Wound Healing % 100   Wound Assessment Pink/red   Drainage Amount Small   Drainage Description Serous   Wound Odor None   Prema-Wound/Incision Assessment Intact   Edges Flat/open edges   Wound Ankle Right;Medial;Distal #2   Date First Assessed/Time First Assessed: 07/18/22 0830   Location: Ankle  Wound Location Orientation: Right;Medial;Distal  Wound Description: #2   Wound Image    Wound Length (cm) 0.3 cm   Wound Width (cm) 2 cm  (clustered)   Wound Depth (cm) 0 cm   Wound Surface Area (cm^2) 0.6 cm^2   Change in Wound Size % 60   Wound Volume (cm^3) 0 cm^3   Wound Healing % 100   Wound Assessment Other (Comment)   Drainage Amount None   Wound Odor None   Prema-Wound/Incision Assessment Blanchable erythema   Edges Flat/open edges   Pain 1   Pain Scale 1 Numeric (0 - 10)   Pain Intensity 1 0   Visit Vitals  /79 (BP 1 Location: Left upper arm, BP Patient Position: Sitting)   Pulse 72   Temp 97.3 °F (36.3 °C)   Resp 18

## 2022-08-01 NOTE — WOUND CARE
08/01/22 0842   Right Leg Edema Point of Measurement   Compression Therapy Compression stockings   Left Leg Edema Point of Measurement   Compression Therapy Compression stockings   Wound Ankle Right;Medial;Proximal #1   Date First Assessed/Time First Assessed: 07/18/22 0830   Location: Ankle  Wound Location Orientation: Right;Medial;Proximal  Wound Description: #1   Dressing/Treatment Other (Comment); Silicone border  (ioplex)   Wound Ankle Right;Medial;Distal #2   Date First Assessed/Time First Assessed: 07/18/22 0830   Location: Ankle  Wound Location Orientation: Right;Medial;Distal  Wound Description: #2   Dressing/Treatment Other (Comment); Silicone border  (ioplex)   Discharge Condition: Stable     Pain: 0    Ambulatory Status: Walking    Discharge Destination: Home     Transportation: Car    Accompanied by: Self     Discharge instructions reviewed with Patient and copy or written instructions have been provided. All questions/concerns have been addressed at this time.

## 2022-08-02 ENCOUNTER — PATIENT MESSAGE (OUTPATIENT)
Dept: INTERNAL MEDICINE CLINIC | Age: 52
End: 2022-08-02

## 2022-08-02 LAB — TESTOST SERPL-MCNC: 217 NG/DL (ref 264–916)

## 2022-08-15 ENCOUNTER — HOSPITAL ENCOUNTER (OUTPATIENT)
Dept: WOUND CARE | Age: 52
Discharge: HOME | End: 2022-08-15
Payer: COMMERCIAL

## 2022-08-15 VITALS
SYSTOLIC BLOOD PRESSURE: 117 MMHG | TEMPERATURE: 97.9 F | HEART RATE: 71 BPM | RESPIRATION RATE: 18 BRPM | DIASTOLIC BLOOD PRESSURE: 76 MMHG

## 2022-08-15 DIAGNOSIS — L97.919 IDIOPATHIC CHRONIC VENOUS HYPERTENSION OF RIGHT LOWER EXTREMITY WITH ULCER (HCC): Primary | ICD-10-CM

## 2022-08-15 DIAGNOSIS — L97.312 NON-PRESSURE CHRONIC ULCER OF RIGHT ANKLE WITH FAT LAYER EXPOSED (HCC): ICD-10-CM

## 2022-08-15 DIAGNOSIS — I87.311 IDIOPATHIC CHRONIC VENOUS HYPERTENSION OF RIGHT LOWER EXTREMITY WITH ULCER (HCC): Primary | ICD-10-CM

## 2022-08-15 PROCEDURE — 74011000250 HC RX REV CODE- 250: Performed by: PODIATRIST

## 2022-08-15 PROCEDURE — 99211 OFF/OP EST MAY X REQ PHY/QHP: CPT

## 2022-08-15 RX ORDER — CLOBETASOL PROPIONATE 0.5 MG/G
OINTMENT TOPICAL ONCE
Status: CANCELLED | OUTPATIENT
Start: 2022-08-15 | End: 2022-08-15

## 2022-08-15 RX ORDER — BETAMETHASONE DIPROPIONATE 0.5 MG/G
OINTMENT TOPICAL ONCE
Status: CANCELLED | OUTPATIENT
Start: 2022-08-15 | End: 2022-08-15

## 2022-08-15 RX ORDER — LIDOCAINE 50 MG/G
OINTMENT TOPICAL ONCE
Status: CANCELLED | OUTPATIENT
Start: 2022-08-15 | End: 2022-08-15

## 2022-08-15 RX ORDER — LIDOCAINE HYDROCHLORIDE 20 MG/ML
JELLY TOPICAL ONCE
Status: CANCELLED | OUTPATIENT
Start: 2022-08-15 | End: 2022-08-15

## 2022-08-15 RX ORDER — BACITRACIN 500 [USP'U]/G
OINTMENT TOPICAL ONCE
Status: CANCELLED | OUTPATIENT
Start: 2022-08-15 | End: 2022-08-15

## 2022-08-15 RX ORDER — TRIAMCINOLONE ACETONIDE 1 MG/G
OINTMENT TOPICAL ONCE
Status: CANCELLED | OUTPATIENT
Start: 2022-08-15 | End: 2022-08-15

## 2022-08-15 RX ORDER — GENTAMICIN SULFATE 1 MG/G
OINTMENT TOPICAL ONCE
Status: CANCELLED | OUTPATIENT
Start: 2022-08-15 | End: 2022-08-15

## 2022-08-15 RX ORDER — LIDOCAINE 40 MG/G
CREAM TOPICAL ONCE
Status: CANCELLED | OUTPATIENT
Start: 2022-08-15 | End: 2022-08-15

## 2022-08-15 RX ORDER — BACITRACIN ZINC AND POLYMYXIN B SULFATE 500; 1000 [USP'U]/G; [USP'U]/G
OINTMENT TOPICAL ONCE
Status: CANCELLED | OUTPATIENT
Start: 2022-08-15 | End: 2022-08-15

## 2022-08-15 RX ORDER — LIDOCAINE HYDROCHLORIDE 40 MG/ML
SOLUTION TOPICAL ONCE
Status: CANCELLED | OUTPATIENT
Start: 2022-08-15 | End: 2022-08-15

## 2022-08-15 RX ORDER — LIDOCAINE HYDROCHLORIDE 20 MG/ML
15 SOLUTION OROPHARYNGEAL ONCE
Status: CANCELLED | OUTPATIENT
Start: 2022-08-15 | End: 2022-08-15

## 2022-08-15 RX ORDER — MUPIROCIN 20 MG/G
OINTMENT TOPICAL ONCE
Status: CANCELLED | OUTPATIENT
Start: 2022-08-15 | End: 2022-08-15

## 2022-08-15 RX ORDER — SILVER SULFADIAZINE 10 G/1000G
CREAM TOPICAL ONCE
Status: CANCELLED | OUTPATIENT
Start: 2022-08-15 | End: 2022-08-15

## 2022-08-15 RX ADMIN — Medication: at 08:20

## 2022-08-15 NOTE — WOUND CARE
[address]   Progress Note and Procedure Note   NO Procedure      Bob Mead  MEDICAL RECORD NUMBER:  [id number]  AGE: 47 y.o. RACE WHITE/NON-  GENDER: male  : 1970  EPISODE DATE:  8/15/2022    Subjective:     Chief Complaint   Patient presents with    Wound Check     Right medial ankle wounds         HISTORY of PRESENT ILLNESS HPI    Bob Mead is a 47 y.o. male who presents today for wound/ulcer evaluation. History of Wound Context: Mr. Say Hermna is a 53yo male with a PMH of HTN, testicular cancer treated with chemo and venous insufficiencies. He has had previous venous ablation procedures to LE. LE wounds tend to pop up from time to time. Currently wound care with Triamcinolone. Wears compression stockings.     Wound/Ulcer Pain Timing/Severity: none  Quality of pain: n/a  Severity:  0 / 10   Modifying Factors: None  Associated Signs/Symptoms: none    Ulcer Identification:  Ulcer Type: venous    Contributing Factors: venous stasis    Wound: right medial ankle         PAST MEDICAL HISTORY    Past Medical History:   Diagnosis Date    Hypertension     Testicular cancer ([address]     Venous (peripheral) insufficiency         PAST SURGICAL HISTORY    Past Surgical History:   Procedure Laterality Date    HX APPENDECTOMY      HX HERNIA REPAIR      HX UROLOGICAL  2000    testicular cancer surgery    VASCULAR SURGERY PROCEDURE UNLIST Left        FAMILY HISTORY    Family History   Problem Relation Age of Onset    Hypertension Mother     Hypertension Father     Diabetes Brother        SOCIAL HISTORY    Social History     Tobacco Use    Smoking status: Former    Smokeless tobacco: Never   Vaping Use    Vaping Use: Never used   Substance Use Topics    Alcohol use: Yes    Drug use: Never       ALLERGIES    No Known Allergies    MEDICATIONS    Current Outpatient Medications on File Prior to Encounter   Medication Sig Dispense Refill    chlorthalidone (HYGROTON) 25 mg tablet Take 25 mg by mouth daily. Indications: high blood pressure      simvastatin (ZOCOR) 20 mg tablet Take 1 Tablet by mouth daily. 90 Tablet 1    olmesartan (BENICAR) 40 mg tablet Take 1 Tablet by mouth daily. 90 Tablet 1    carvediloL (COREG) 12.5 mg tablet Take 1 Tablet by mouth two (2) times a day. 180 Tablet 1    triamcinolone acetonide (KENALOG) 0.025 % ointment APPLY TO AFFECTED AREA EVERY DAY       No current facility-administered medications on file prior to encounter. REVIEW OF SYSTEMS    Consitutional: no weight loss, night sweats, fatigue / malaise / lethargy. Musculoskeletal: no joint / extremity pain, misalignment, stiffness, decreased ROM, crepitus. Integument: No pruritis, rashes, lesions, right ankle wound. Psychiatric: No depression, anxiety, paranoia    Objective:     Visit Vitals  /76 (BP 1 Location: Left upper arm, BP Patient Position: Sitting)   Pulse 71   Temp 97.9 °F (36.6 °C)   Resp 18       Wt Readings from Last 3 Encounters:   07/08/22 103.3 kg (227 lb 12.8 oz)       PHYSICAL EXAM    Vascular:  B/L LE  DP 1/4; PT 1/4  capillary fill time brisk, mild spongy edema is present, skin temperature is cool, varicosities are present. Dermatological:     Wound: 1  Location: right ankle  Healed     Nails are WNL. Skin is dry. There is no maceration of the interspaces of the feet b/l. Neurological:  DTR are present, protective sensation per [address] [place] [address] monofilament is intact, patient is AAOx3, mood is normal. Epicritic sensation is intact. Orthopedic:  B/L LE are symmetric, ROM of ankle, STJ, 1st MTPJ is limited, MMT 5 out of 5 for B/L LE. No pedal amputations. Constitutional: Pt is a well developed, mid-aged male. Lymphatics: negative tenderness to palpation of neck/axillary/inguinal nodes.     Assessment:      Problem List Items Addressed This Visit          Circulatory    Idiopathic chronic venous hypertension of right lower extremity with ulcer ([address] - Primary    Relevant Medications    lidocaine (ALOCANE) 4 % topical gel (Completed) (Start on 8/15/2022  9:00 AM)    Other Relevant Orders    INITIATE OUTPATIENT WOUND CARE PROTOCOL       Other    Non-pressure chronic ulcer of right ankle with fat layer exposed ([address]    Relevant Medications    lidocaine (ALOCANE) 4 % topical gel (Completed) (Start on 8/15/2022  9:00 AM)    Other Relevant Orders    INITIATE OUTPATIENT WOUND CARE PROTOCOL       Procedure Note  Indications:  Based on my examination of this patient's wound(s)/ulcer(s) today, debridement is not required to promote healing and evaluate the wound base. Plan:     Right leg venous hypertension with ulcer (I87.311)     Right ankle non-pressure ulcer to fat (L97.312)     - Pt seen and evaluated  - Pt presents with a right ankle ulcer - healed  - Pt has compression stockings  - Pt had vein stripping procedure on LLE a few years back. Recommended he look into doing the same on his right to prevent similar wounds. Pt given contact for Dr Allie Hutson.  - Pt discharged from [facility]. Treatment Note please see attached Discharge Instructions    Written patient dismissal instructions given to patient or POA.          Electronically signed by Marnie Beltran DPM on 8/15/2022 at 8:30 AM

## 2022-08-15 NOTE — WOUND CARE
08/15/22 0815   Anesthetic   Anesthetic 4% Lidocaine Liquid Topical   Right Leg Edema Point of Measurement   Leg circumference 36.5 cm   Ankle circumference 21.5 cm   RLE Peripheral Vascular    Capillary Refill Less than/equal to 3 seconds   Color Appropriate for race   Temperature Warm   Pedal Pulse Palpable   Wound Ankle Right;Medial;Proximal #1   Date First Assessed/Time First Assessed: 07/18/22 0830   Location: Ankle  Wound Location Orientation: Right;Medial;Proximal  Wound Description: #1   Wound Image    Wound Length (cm) 0.1 cm   Wound Width (cm) 0.1 cm   Wound Depth (cm) 0.1 cm   Wound Surface Area (cm^2) 0.01 cm^2   Change in Wound Size % 99.82   Wound Volume (cm^3) 0.001 cm^3   Wound Healing % 100   Wound Assessment Other (Comment)  (scabbed)   Drainage Amount None   Wound Odor None   Prema-Wound/Incision Assessment Intact   Edges Attached edges   Wound Ankle Right;Medial;Distal #2   Date First Assessed/Time First Assessed: 07/18/22 0830   Location: Ankle  Wound Location Orientation: Right;Medial;Distal  Wound Description: #2   Wound Image    Wound Length (cm) 0.4 cm   Wound Width (cm) 1.9 cm  (clustered)   Wound Depth (cm) 0.1 cm   Wound Surface Area (cm^2) 0.76 cm^2   Change in Wound Size % 49.33   Wound Volume (cm^3) 0.076 cm^3   Wound Healing % 75   Wound Assessment Other (Comment)  (scabbed)   Drainage Amount None   Wound Odor None   Prema-Wound/Incision Assessment Intact   Edges Attached edges   Pain 1   Pain Scale 1 Numeric (0 - 10)   Pain Intensity 1 0   Visit Vitals  /76 (BP 1 Location: Left upper arm, BP Patient Position: Sitting)   Pulse 71   Temp 97.9 °F (36.6 °C)   Resp 18

## 2022-08-30 ENCOUNTER — OFFICE VISIT (OUTPATIENT)
Dept: INTERNAL MEDICINE CLINIC | Age: 52
End: 2022-08-30
Payer: COMMERCIAL

## 2022-08-30 VITALS
RESPIRATION RATE: 16 BRPM | WEIGHT: 223.2 LBS | HEIGHT: 73 IN | OXYGEN SATURATION: 98 % | DIASTOLIC BLOOD PRESSURE: 79 MMHG | BODY MASS INDEX: 29.58 KG/M2 | TEMPERATURE: 98.1 F | HEART RATE: 70 BPM | SYSTOLIC BLOOD PRESSURE: 120 MMHG

## 2022-08-30 DIAGNOSIS — E29.1 HYPOGONADISM IN MALE: Primary | ICD-10-CM

## 2022-08-30 DIAGNOSIS — L97.911 CHRONIC ULCER OF RIGHT LEG, LIMITED TO BREAKDOWN OF SKIN (HCC): ICD-10-CM

## 2022-08-30 DIAGNOSIS — I10 HYPERTENSION, UNSPECIFIED TYPE: ICD-10-CM

## 2022-08-30 PROCEDURE — 99214 OFFICE O/P EST MOD 30 MIN: CPT | Performed by: INTERNAL MEDICINE

## 2022-08-30 RX ORDER — TESTOSTERONE 20.25 MG/1.25G
40.5 GEL TOPICAL DAILY
Qty: 1 EACH | Refills: 3 | Status: SHIPPED | OUTPATIENT
Start: 2022-08-30 | End: 2022-10-12 | Stop reason: SDUPTHER

## 2022-08-30 RX ORDER — TRIAMCINOLONE ACETONIDE 0.25 MG/G
OINTMENT TOPICAL
Qty: 30 G | Refills: 1 | Status: SHIPPED | OUTPATIENT
Start: 2022-08-30 | End: 2022-10-10 | Stop reason: ALTCHOICE

## 2022-08-30 RX ORDER — CHLORTHALIDONE 25 MG/1
25 TABLET ORAL DAILY
Qty: 90 TABLET | Refills: 1 | Status: SHIPPED | OUTPATIENT
Start: 2022-08-30

## 2022-08-30 NOTE — PROGRESS NOTES
Lisbeth Abrams is a 47 y.o. male who presents today for Request For New Medication (testosterone)  . He has a history of   Patient Active Problem List   Diagnosis Code    Hyperlipidemia, unspecified hyperlipidemia type E78.5    Hypertension, unspecified type I10    Localized edema R60.0    Anemia, unspecified D64.9    Poor circulation of extremity R09.89    History of testicular cancer Z85.47    Splenomegaly R16.1    Thyroid nodule E04.1    Chronic ulcer of right leg, limited to breakdown of skin (HCC) L97.911    Fatigue, unspecified type R53.83    Abnormal finding on MRI of brain R90.89    History of nephrolithiasis Z87.442    Idiopathic chronic venous hypertension of right lower extremity with ulcer ([facility] [address] I87.311, L97.919    Non-pressure chronic ulcer of right ankle with fat layer exposed ([facility] [address] L97.312   . Today patient is here for follow up. Hypogonadism: Noted on blood work. We discussed options including topical testosterone replacement. Patient denies any history of blood clots. Hypertension-did resume chlorthalidone at last visit. His blood pressures did drop when he contracted COVID, but otherwise has been stable. Hypertension ROS: taking medications as instructed, no medication side effects noted, no TIA's, no chest pain on exertion, no dyspnea on exertion, no swelling of ankles     reports that he has quit smoking. He has never used smokeless tobacco.    reports current alcohol use. BP Readings from Last 2 Encounters:   08/30/22 120/79   08/15/22 117/76     R leg has healed. No longer seeing wound care. No pain. ROS  Review of Systems   Constitutional:  Positive for malaise/fatigue. Negative for chills, fever and weight loss. HENT:  Negative for congestion and sore throat. Eyes:  Negative for blurred vision, double vision and photophobia. Respiratory:  Negative for cough and shortness of breath. Cardiovascular:  Negative for chest pain, palpitations and leg swelling. Gastrointestinal:  Negative for abdominal pain, constipation, diarrhea, heartburn, nausea and vomiting. Genitourinary:  Negative for dysuria, frequency and urgency. Musculoskeletal:  Negative for joint pain and myalgias. Skin:  Negative for rash. Healed ulcer   Neurological: Negative. Negative for headaches. Endo/Heme/Allergies:  Does not bruise/bleed easily. Psychiatric/Behavioral:  Negative for memory loss and suicidal ideas. Visit Vitals  /79 (BP 1 Location: Left upper arm, BP Patient Position: Sitting, BP Cuff Size: Large adult)   Pulse 70   Temp 98.1 °F (36.7 °C) (Oral)   Resp 16   Ht 6' 1\" (1.854 m)   Wt 223 lb 3.2 oz (101.2 kg)   SpO2 98%   BMI 29.45 kg/m²       Physical Exam  Constitutional:       Appearance: He is well-developed. He is not diaphoretic. HENT:      Head: Normocephalic and atraumatic. Eyes:      Pupils: Pupils are equal, round, and reactive to light. Cardiovascular:      Rate and Rhythm: Normal rate and regular rhythm. Heart sounds: No murmur heard. Pulmonary:      Effort: Pulmonary effort is normal. No respiratory distress. Breath sounds: Normal breath sounds. Musculoskeletal:         General: Normal range of motion. Skin:     General: Skin is warm and dry. Neurological:      Mental Status: He is alert and oriented to person, place, and time. Psychiatric:         Behavior: Behavior normal.         Current Outpatient Medications   Medication Sig    chlorthalidone (HYGROTON) 25 mg tablet Take 25 mg by mouth daily. Indications: high blood pressure    triamcinolone acetonide (KENALOG) 0.025 % ointment APPLY TO AFFECTED AREA EVERY DAY    simvastatin (ZOCOR) 20 mg tablet Take 1 Tablet by mouth daily. olmesartan (BENICAR) 40 mg tablet Take 1 Tablet by mouth daily. carvediloL (COREG) 12.5 mg tablet Take 1 Tablet by mouth two (2) times a day. No current facility-administered medications for this visit.         Past Medical History: Diagnosis Date    Hypertension     Testicular cancer ([facility] [address]     Venous (peripheral) insufficiency       Past Surgical History:   Procedure Laterality Date    HX APPENDECTOMY  1985    HX HERNIA REPAIR      HX UROLOGICAL  2000    testicular cancer surgery    VASCULAR SURGERY PROCEDURE UNLIST Left 2015      Social History     Tobacco Use    Smoking status: Former    Smokeless tobacco: Never   Substance Use Topics    Alcohol use: Yes      Family History   Problem Relation Age of Onset    Hypertension Mother     Hypertension Father     Diabetes Brother         No Known Allergies     Assessment/Plan  Diagnoses and all orders for this visit:    1. Hypogonadism in male-discussed how to administer as well as monitoring and side effects of testosterone. Patient will start topical testosterone. We will repeat blood work when I see him back in October  -     testosterone (ANDROGEL) 20.25 mg/1.25 gram (1.62 %) gel; Apply 41 mg to affected area daily. Max Daily Amount: 41 mg.    2. Hypertension, unspecified type-blood pressure looks good. -     chlorthalidone (HYGROTON) 25 mg tablet; Take 1 Tablet by mouth daily. Indications: high blood pressure    3. Chronic ulcer of right leg, limited to breakdown of skin (HCC)-has healed. May need vascular follow-up if this recurs  -     triamcinolone acetonide (KENALOG) 0.025 % ointment; APPLY TO AFFECTED AREA EVERY DAY          Kati Ramírez MD  8/30/2022    This note was created with the help of speech recognition software Janice Longoria) and may contain some 'sound alike' errors.

## 2022-08-30 NOTE — PROGRESS NOTES
Identified pt with two pt identifiers(name and ). Reviewed record in preparation for visit and have obtained necessary documentation. All patient medications has been reviewed. Chief Complaint   Patient presents with    Request For New Medication     testosterone       3 most recent PHQ Screens 2022   Little interest or pleasure in doing things Not at all   Feeling down, depressed, irritable, or hopeless Not at all   Total Score PHQ 2 0     No flowsheet data found. Health Maintenance Due   Topic    Colorectal Cancer Screening Combo     Shingrix Vaccine Age 50> (1 of 2)    COVID-19 Vaccine (3 - Booster for FreeBrie series)     Health Maintenance Review: Patient reminded of \"due or due soon\" health maintenance. I have asked the patient to contact his/her primary care provider (PCP) for follow-up on his/her health maintenance. Vitals:    22 1005   BP: 120/79   Pulse: 70   Resp: 16   Temp: 98.1 °F (36.7 °C)   TempSrc: Oral   SpO2: 98%   Weight: 223 lb 3.2 oz (101.2 kg)   Height: 6' 1\" (1.854 m)   PainSc:   0 - No pain       Wt Readings from Last 3 Encounters:   22 223 lb 3.2 oz (101.2 kg)   22 227 lb 12.8 oz (103.3 kg)     Temp Readings from Last 3 Encounters:   22 98.1 °F (36.7 °C) (Oral)   08/15/22 97.9 °F (36.6 °C)   22 97.3 °F (36.3 °C)     BP Readings from Last 3 Encounters:   22 120/79   08/15/22 117/76   22 112/79     Pulse Readings from Last 3 Encounters:   22 70   08/15/22 71   22 72       1. \"Have you been to the ER, urgent care clinic since your last visit? Hospitalized since your last visit? \" No    2. \"Have you seen or consulted any other health care providers outside of the [address] [place] [address] since your last visit? \" No     3. For patients aged 39-70: Has the patient had a colonoscopy / FIT/ Cologuard?  Yes - no Care Gap present

## 2022-09-04 ENCOUNTER — DOCUMENTATION ONLY (OUTPATIENT)
Dept: INTERNAL MEDICINE CLINIC | Age: 52
End: 2022-09-04

## 2022-10-10 ENCOUNTER — OFFICE VISIT (OUTPATIENT)
Dept: INTERNAL MEDICINE CLINIC | Age: 52
End: 2022-10-10
Payer: COMMERCIAL

## 2022-10-10 VITALS
OXYGEN SATURATION: 99 % | SYSTOLIC BLOOD PRESSURE: 128 MMHG | RESPIRATION RATE: 14 BRPM | TEMPERATURE: 98.2 F | DIASTOLIC BLOOD PRESSURE: 85 MMHG | BODY MASS INDEX: 30.54 KG/M2 | WEIGHT: 230.4 LBS | HEIGHT: 73 IN | HEART RATE: 73 BPM

## 2022-10-10 DIAGNOSIS — I10 HYPERTENSION, UNSPECIFIED TYPE: ICD-10-CM

## 2022-10-10 DIAGNOSIS — D64.9 ANEMIA, UNSPECIFIED TYPE: ICD-10-CM

## 2022-10-10 DIAGNOSIS — R16.1 SPLENOMEGALY: ICD-10-CM

## 2022-10-10 DIAGNOSIS — Z12.5 PROSTATE CANCER SCREENING: ICD-10-CM

## 2022-10-10 DIAGNOSIS — E29.1 HYPOGONADISM IN MALE: Primary | ICD-10-CM

## 2022-10-10 DIAGNOSIS — L97.911 CHRONIC ULCER OF RIGHT LEG, LIMITED TO BREAKDOWN OF SKIN (HCC): ICD-10-CM

## 2022-10-10 PROCEDURE — 99214 OFFICE O/P EST MOD 30 MIN: CPT | Performed by: INTERNAL MEDICINE

## 2022-10-10 NOTE — PROGRESS NOTES
Juanjo Lundberg  Identified pt with two pt identifiers(name and ). Chief Complaint   Patient presents with    Follow-up     RM18// pt presenting today for routine medication check. 1. Have you been to the ER, urgent care clinic since your last visit? Hospitalized since your last visit? NO    2. Have you seen or consulted any other health care providers outside of the [address] [place] [address] since your last visit? Include any pap smears or colon screening. NO      Provider notified of reason for visit, vitals and flowsheets obtained on patients. Patient received paperwork for advance directive during previous visit but has not completed at this time     Reviewed record In preparation for visit, huddled with provider and have obtained necessary documentation      Health Maintenance Due   Topic    Colorectal Cancer Screening Combo     Shingrix Vaccine Age 50> (1 of 2)    COVID-19 Vaccine (3 - Booster for Buddy Terry series)       Wt Readings from Last 3 Encounters:   10/10/22 230 lb 6.4 oz (104.5 kg)   22 223 lb 3.2 oz (101.2 kg)   22 227 lb 12.8 oz (103.3 kg)     Temp Readings from Last 3 Encounters:   10/10/22 98.2 °F (36.8 °C) (Oral)   22 98.1 °F (36.7 °C) (Oral)   08/15/22 97.9 °F (36.6 °C)     BP Readings from Last 3 Encounters:   10/10/22 128/85   22 120/79   08/15/22 117/76     Pulse Readings from Last 3 Encounters:   10/10/22 73   22 70   08/15/22 71     Vitals:    10/10/22 0752   BP: 128/85   Pulse: 73   Resp: 14   Temp: 98.2 °F (36.8 °C)   TempSrc: Oral   SpO2: 99%   Weight: 230 lb 6.4 oz (104.5 kg)   Height: 6' 1\" (1.854 m)   PainSc:   0 - No pain         Learning Assessment:  :     No flowsheet data found.     Depression Screening:  :     3 most recent PHQ Screens 10/10/2022   Little interest or pleasure in doing things Not at all   Feeling down, depressed, irritable, or hopeless Not at all   Total Score PHQ 2 0       Fall Risk Assessment:  :     No flowsheet data found.    Abuse Screening:  :     No flowsheet data found. ADL Screening:  :     No flowsheet data found. Medication reconciliation up to date and corrected with patient at this time.

## 2022-10-10 NOTE — PROGRESS NOTES
Deja Pantoja is a 47 y.o. male who presents today for Follow-up (RM18// pt presenting today for routine medication check. )  . He has a history of   Patient Active Problem List   Diagnosis Code    Hyperlipidemia, unspecified hyperlipidemia type E78.5    Hypertension, unspecified type I10    Localized edema R60.0    Anemia, unspecified D64.9    Poor circulation of extremity R09.89    History of testicular cancer Z85.47    Splenomegaly R16.1    Thyroid nodule E04.1    Chronic ulcer of right leg, limited to breakdown of skin (HCC) L97.911    Fatigue, unspecified type R53.83    Abnormal finding on MRI of brain R90.89    History of nephrolithiasis Z87.442    Idiopathic chronic venous hypertension of right lower extremity with ulcer ([facility] [address] I87.311, L97.919    Non-pressure chronic ulcer of right ankle with fat layer exposed ([facility] [address] L97.312   . Today patient is here for f/u. Does have a small opening of a chronic ulcer to the medial aspect of left leg. Has not yet made an appointment with vascular surgeon. Historically has had issues with his left leg until vein procedure. Hypertension-blood pressure stable  Hypertension ROS: taking medications as instructed, no medication side effects noted, no TIA's, no chest pain on exertion, no dyspnea on exertion, no swelling of ankles     reports that he has quit smoking. He has never used smokeless tobacco.    reports current alcohol use. BP Readings from Last 2 Encounters:   10/10/22 128/85   08/30/22 120/79     Hypogonadism: started topical testosterone. Since he is feeling overall better. Nataliya Iyer Splenomegaly: They have been monitoring this. Last CT scan was done 6 months ago he had had a significant increase in spleen size to 15.8 cm. We will repeat a CT scan    ROS  Review of Systems   Constitutional:  Negative for chills, fever and weight loss. HENT:  Negative for congestion and sore throat. Eyes:  Negative for blurred vision, double vision and photophobia. Respiratory:  Negative for cough and shortness of breath. Cardiovascular:  Negative for chest pain, palpitations and leg swelling. Gastrointestinal:  Negative for abdominal pain, constipation, diarrhea, heartburn, nausea and vomiting. Genitourinary:  Negative for dysuria, frequency and urgency. Musculoskeletal:  Negative for joint pain and myalgias. Skin:  Negative for rash. Ulcer to medial aspect of right leg. Neurological: Negative. Negative for headaches. Endo/Heme/Allergies:  Does not bruise/bleed easily. Psychiatric/Behavioral:  Negative for memory loss and suicidal ideas. Visit Vitals  /85 (BP 1 Location: Left upper arm, BP Patient Position: Sitting, BP Cuff Size: Large adult)   Pulse 73   Temp 98.2 °F (36.8 °C) (Oral)   Resp 14   Ht 6' 1\" (1.854 m)   Wt 230 lb 6.4 oz (104.5 kg)   SpO2 99%   BMI 30.40 kg/m²       Physical Exam  Constitutional:       Appearance: He is well-developed. He is not diaphoretic. HENT:      Head: Normocephalic and atraumatic. Eyes:      Pupils: Pupils are equal, round, and reactive to light. Cardiovascular:      Rate and Rhythm: Normal rate and regular rhythm. Heart sounds: No murmur heard. Pulmonary:      Effort: Pulmonary effort is normal. No respiratory distress. Breath sounds: Normal breath sounds. Musculoskeletal:         General: Normal range of motion. Comments: Small open ulcer to medial aspect of left leg. No signs of surrounding cellulitis. Skin:     General: Skin is warm and dry. Neurological:      Mental Status: He is alert and oriented to person, place, and time. Psychiatric:         Behavior: Behavior normal.         Current Outpatient Medications   Medication Sig    testosterone (ANDROGEL) 20.25 mg/1.25 gram (1.62 %) gel Apply 41 mg to affected area daily. Max Daily Amount: 41 mg.    chlorthalidone (HYGROTON) 25 mg tablet Take 1 Tablet by mouth daily.  Indications: high blood pressure    simvastatin (ZOCOR) 20 mg tablet Take 1 Tablet by mouth daily. olmesartan (BENICAR) 40 mg tablet Take 1 Tablet by mouth daily. carvediloL (COREG) 12.5 mg tablet Take 1 Tablet by mouth two (2) times a day. No current facility-administered medications for this visit. Past Medical History:   Diagnosis Date    Hypertension     Testicular cancer ([address]     Venous (peripheral) insufficiency       Past Surgical History:   Procedure Laterality Date    HX APPENDECTOMY  1985    HX HERNIA REPAIR      HX UROLOGICAL  2000    testicular cancer surgery    VASCULAR SURGERY PROCEDURE UNLIST Left 2015      Social History     Tobacco Use    Smoking status: Former    Smokeless tobacco: Never   Substance Use Topics    Alcohol use: Yes      Family History   Problem Relation Age of Onset    Hypertension Mother     Hypertension Father     Diabetes Brother         No Known Allergies     Assessment/Plan  Diagnoses and all orders for this visit:    1. Hypogonadism in male-energy level slightly improved. Repeat testing. If still low will increase dose and send a lab slip. Patient will see me back in 6 months  -     METABOLIC PANEL, COMPREHENSIVE; Future  -     CBC WITH AUTOMATED DIFF; Future  -     PSA SCREENING (SCREENING); Future  -     TESTOSTERONE, TOTAL, ADULT MALE; Future    2. Splenomegaly-had increased in size 6 months ago. Repeat CT scan  -     CT ABD PELV W CONT; Future    3. Anemia, unspecified type  -     CT ABD PELV W CONT; Future    4. Hypertension, unspecified type-blood pressures are stable    5. Prostate cancer screening  -     PSA SCREENING (SCREENING); Future    6. Chronic ulcer of right leg, limited to breakdown of skin (HCC)-urged him to see vascular surgeon in the coming weeks. Lianne Bryant MD  10/10/2022    This note was created with the help of speech recognition software Dani Engle) and may contain some 'sound alike' errors.

## 2022-10-11 LAB
ALBUMIN SERPL-MCNC: 3.8 G/DL (ref 3.5–5)
ALBUMIN/GLOB SERPL: 1.1 {RATIO} (ref 1.1–2.2)
ALP SERPL-CCNC: 60 U/L (ref 45–117)
ALT SERPL-CCNC: 32 U/L (ref 12–78)
ANION GAP SERPL CALC-SCNC: 7 MMOL/L (ref 5–15)
AST SERPL-CCNC: 15 U/L (ref 15–37)
BASOPHILS # BLD: 0.1 K/UL (ref 0–0.1)
BASOPHILS NFR BLD: 1 % (ref 0–1)
BILIRUB SERPL-MCNC: 0.9 MG/DL (ref 0.2–1)
BUN SERPL-MCNC: 26 MG/DL (ref 6–20)
BUN/CREAT SERPL: 21 (ref 12–20)
CALCIUM SERPL-MCNC: 9.2 MG/DL (ref 8.5–10.1)
CHLORIDE SERPL-SCNC: 103 MMOL/L (ref 97–108)
CO2 SERPL-SCNC: 27 MMOL/L (ref 21–32)
CREAT SERPL-MCNC: 1.26 MG/DL (ref 0.7–1.3)
DIFFERENTIAL METHOD BLD: ABNORMAL
EOSINOPHIL # BLD: 0.1 K/UL (ref 0–0.4)
EOSINOPHIL NFR BLD: 1 % (ref 0–7)
ERYTHROCYTE [DISTWIDTH] IN BLOOD BY AUTOMATED COUNT: 15.4 % (ref 11.5–14.5)
GLOBULIN SER CALC-MCNC: 3.4 G/DL (ref 2–4)
GLUCOSE SERPL-MCNC: 95 MG/DL (ref 65–100)
HCT VFR BLD AUTO: 34.9 % (ref 36.6–50.3)
HGB BLD-MCNC: 11.4 G/DL (ref 12.1–17)
IMM GRANULOCYTES # BLD AUTO: 0.1 K/UL (ref 0–0.04)
IMM GRANULOCYTES NFR BLD AUTO: 2 % (ref 0–0.5)
LYMPHOCYTES # BLD: 1.5 K/UL (ref 0.8–3.5)
LYMPHOCYTES NFR BLD: 21 % (ref 12–49)
MCH RBC QN AUTO: 29.8 PG (ref 26–34)
MCHC RBC AUTO-ENTMCNC: 32.7 G/DL (ref 30–36.5)
MCV RBC AUTO: 91.4 FL (ref 80–99)
MONOCYTES # BLD: 0.6 K/UL (ref 0–1)
MONOCYTES NFR BLD: 9 % (ref 5–13)
NEUTS SEG # BLD: 4.7 K/UL (ref 1.8–8)
NEUTS SEG NFR BLD: 66 % (ref 32–75)
NRBC # BLD: 0.14 K/UL (ref 0–0.01)
NRBC BLD-RTO: 2 PER 100 WBC
PLATELET # BLD AUTO: 304 K/UL (ref 150–400)
PMV BLD AUTO: 9.8 FL (ref 8.9–12.9)
POTASSIUM SERPL-SCNC: 3.8 MMOL/L (ref 3.5–5.1)
PROT SERPL-MCNC: 7.2 G/DL (ref 6.4–8.2)
PSA SERPL-MCNC: 0.9 NG/ML (ref 0.01–4)
RBC # BLD AUTO: 3.82 M/UL (ref 4.1–5.7)
SODIUM SERPL-SCNC: 137 MMOL/L (ref 136–145)
WBC # BLD AUTO: 7.1 K/UL (ref 4.1–11.1)

## 2022-10-12 DIAGNOSIS — E29.1 HYPOGONADISM IN MALE: ICD-10-CM

## 2022-10-12 LAB — TESTOST SERPL-MCNC: 239 NG/DL (ref 264–916)

## 2022-10-12 RX ORDER — TESTOSTERONE 20.25 MG/1.25G
60.75 GEL TOPICAL DAILY
Qty: 1 EACH | Refills: 3 | Status: SHIPPED | OUTPATIENT
Start: 2022-10-12

## 2022-10-14 ENCOUNTER — HOSPITAL ENCOUNTER (OUTPATIENT)
Dept: WOUND CARE | Age: 52
Discharge: HOME | End: 2022-10-14
Payer: COMMERCIAL

## 2022-10-14 VITALS
HEART RATE: 83 BPM | RESPIRATION RATE: 18 BRPM | WEIGHT: 230 LBS | HEIGHT: 73 IN | DIASTOLIC BLOOD PRESSURE: 62 MMHG | SYSTOLIC BLOOD PRESSURE: 129 MMHG | BODY MASS INDEX: 30.48 KG/M2 | TEMPERATURE: 97.8 F

## 2022-10-14 DIAGNOSIS — I87.311 IDIOPATHIC CHRONIC VENOUS HYPERTENSION OF RIGHT LOWER EXTREMITY WITH ULCER (HCC): Primary | ICD-10-CM

## 2022-10-14 DIAGNOSIS — L97.312 NON-PRESSURE CHRONIC ULCER OF RIGHT ANKLE WITH FAT LAYER EXPOSED (HCC): ICD-10-CM

## 2022-10-14 DIAGNOSIS — L97.919 IDIOPATHIC CHRONIC VENOUS HYPERTENSION OF RIGHT LOWER EXTREMITY WITH ULCER (HCC): Primary | ICD-10-CM

## 2022-10-14 PROCEDURE — 29580 STRAPPING UNNA BOOT: CPT

## 2022-10-14 PROCEDURE — 74011000250 HC RX REV CODE- 250: Performed by: PODIATRIST

## 2022-10-14 RX ORDER — LIDOCAINE HYDROCHLORIDE 20 MG/ML
JELLY TOPICAL ONCE
Status: CANCELLED | OUTPATIENT
Start: 2022-10-14 | End: 2022-10-14

## 2022-10-14 RX ORDER — LIDOCAINE 50 MG/G
OINTMENT TOPICAL ONCE
Status: CANCELLED | OUTPATIENT
Start: 2022-10-14 | End: 2022-10-14

## 2022-10-14 RX ORDER — MUPIROCIN 20 MG/G
OINTMENT TOPICAL ONCE
Status: CANCELLED | OUTPATIENT
Start: 2022-10-14 | End: 2022-10-14

## 2022-10-14 RX ORDER — CLOBETASOL PROPIONATE 0.5 MG/G
OINTMENT TOPICAL ONCE
Status: CANCELLED | OUTPATIENT
Start: 2022-10-14 | End: 2022-10-14

## 2022-10-14 RX ORDER — SILVER SULFADIAZINE 10 G/1000G
CREAM TOPICAL ONCE
Status: CANCELLED | OUTPATIENT
Start: 2022-10-14 | End: 2022-10-14

## 2022-10-14 RX ORDER — TRIAMCINOLONE ACETONIDE 1 MG/G
OINTMENT TOPICAL ONCE
Status: CANCELLED | OUTPATIENT
Start: 2022-10-14 | End: 2022-10-14

## 2022-10-14 RX ORDER — LIDOCAINE 40 MG/G
CREAM TOPICAL ONCE
Status: CANCELLED | OUTPATIENT
Start: 2022-10-14 | End: 2022-10-14

## 2022-10-14 RX ORDER — BETAMETHASONE DIPROPIONATE 0.5 MG/G
OINTMENT TOPICAL ONCE
Status: CANCELLED | OUTPATIENT
Start: 2022-10-14 | End: 2022-10-14

## 2022-10-14 RX ORDER — LIDOCAINE HYDROCHLORIDE 40 MG/ML
SOLUTION TOPICAL ONCE
Status: CANCELLED | OUTPATIENT
Start: 2022-10-14 | End: 2022-10-14

## 2022-10-14 RX ORDER — GENTAMICIN SULFATE 1 MG/G
OINTMENT TOPICAL ONCE
Status: CANCELLED | OUTPATIENT
Start: 2022-10-14 | End: 2022-10-14

## 2022-10-14 RX ORDER — BACITRACIN ZINC AND POLYMYXIN B SULFATE 500; 1000 [USP'U]/G; [USP'U]/G
OINTMENT TOPICAL ONCE
Status: CANCELLED | OUTPATIENT
Start: 2022-10-14 | End: 2022-10-14

## 2022-10-14 RX ORDER — LIDOCAINE HYDROCHLORIDE 20 MG/ML
15 SOLUTION OROPHARYNGEAL ONCE
Status: CANCELLED | OUTPATIENT
Start: 2022-10-14 | End: 2022-10-14

## 2022-10-14 RX ORDER — BACITRACIN 500 [USP'U]/G
OINTMENT TOPICAL ONCE
Status: CANCELLED | OUTPATIENT
Start: 2022-10-14 | End: 2022-10-14

## 2022-10-14 RX ADMIN — Medication: at 10:19

## 2022-10-14 NOTE — WOUND CARE
10/14/22 1045   Right Leg Edema Point of Measurement   Compression Therapy Unna boot   Wound Ankle Right;Medial #1   Date First Assessed/Time First Assessed: 10/14/22 1014   Present on Hospital Admission: Yes  Location: Ankle  Wound Location Orientation: Right;Medial  Wound Description: #1   Dressing/Treatment Other (Comment)  (betamethasone, ioplex)   Discharge Condition: Stable     Pain: 0    Ambulatory Status: Walking    Discharge Destination: Home     Transportation: Car    Accompanied by: Self     Discharge instructions reviewed with Patient and copy or written instructions have been provided. All questions/concerns have been addressed at this time. [employer] Application   Below Knee    NAME:  Meghana Abad  YOB: 1970  MEDICAL RECORD NUMBER:  [id number]  DATE:  10/14/2022    Applied moisturizing agent to dry skin as needed. Appied primary and secondary dressing as ordered. Applied Unna roll from toes to knee overlapping each time. Applied ace wrap or coban from toes to below the knee. Secured with tape and/or metal clips covered with tape. Instructed patient/caregiver to keep dressing dry and intact. DO NOT REMOVE DRESSING. Instructed pt/family/caregiver to report excessive draining, loose bandage, wet dressing, severe pain or tingling in toes. Applied [place] dressing below the knee to right lower leg. Unna Boot(s) were applied per  Guidelines.     Response to treatment: Well tolerated by patient      Electronically signed by Humaira Skaggs RN on 10/14/2022 at 10:46 AM

## 2022-10-14 NOTE — WOUND CARE
10/14/22 1014   Anesthetic   Anesthetic 4% Lidocaine Liquid Topical   Right Leg Edema Point of Measurement   Leg circumference 36.8 cm   Ankle circumference 22.8 cm   RLE Peripheral Vascular    Capillary Refill Less than/equal to 3 seconds   Color Appropriate for race   Temperature Warm   Pedal Pulse Palpable   Wound Ankle Right;Medial #1   Date First Assessed/Time First Assessed: 10/14/22 1014   Present on Hospital Admission: Yes  Location: Ankle  Wound Location Orientation: Right;Medial  Wound Description: #1   Wound Image    Wound Length (cm) 3.8 cm   Wound Width (cm) 2.2 cm   Wound Depth (cm) 0.1 cm   Wound Surface Area (cm^2) 8.36 cm^2   Wound Volume (cm^3) 0.836 cm^3   Wound Assessment Pink/red;Purple/maroon; Other (Comment)  (dried exudate)   Drainage Amount Moderate   Drainage Description Serous   Wound Odor None   Prema-Wound/Incision Assessment Blanchable erythema   Edges Flat/open edges   Pain 1   Pain Scale 1 Numeric (0 - 10)   Pain Intensity 1 5   Visit Vitals  /62 (BP 1 Location: Right upper arm, BP Patient Position: Sitting)   Pulse 83   Temp 97.8 °F (36.6 °C)   Resp 18   Ht 6' 1\" (1.854 m)   Wt 104.3 kg (230 lb)   BMI 30.34 kg/m²

## 2022-10-14 NOTE — DISCHARGE INSTRUCTIONS
Discharge Instructions for  [facility]  [address] [facility] [place], [address] [address]  Telephone: [phone]    NAME:  [address] [place] [address] Avenue:  1970  DATE:10/14/22      Wound Cleansing:   Do not scrub or use excessive force. Cleanse wound prior to applying a clean dressing with:    [] Normal Saline   [] Keep Wound Dry in Shower      [] Wound Cleanser (***)  [] May Shower at Discharge   [x] cleanse with Casandra Lamp and Casandra Lamp baby shampoo lather leave 2-3 then rinse with water, pat dry and redress wound. Dressings:           Wound Location right medial leg   Apply Primary Dressing:Betamethasone ointment,Ioplex unna's boot           Change dressing     [] Daily      [] Every Other Day   [] Three times per week  [x] Once a week   [] Do Not Change Dressing     [] Other:    Dietary:  [x] Diet as tolerated: [] Diabetic Diet:   [x] Increase Protein: examples ( Meat, cheese, eggs, greek yogurt, premier protein drink, fish, nuts )   [] Other:    Return Appointment:    [x] Return Appointment: With Dr. Mingo Dakin 1week       Electronically signed on 10/14/2022 at 10:31 AM     Sola Cordero 281: Should you experience any significant changes in your wound(s) or have questions about your wound care, please contact the [facility] at [address] [place] [address] 8:00 am - 4:30. If you need help with your wound outside these hours and cannot wait until we are again available, contact your PCP or go to the hospital emergency room. PLEASE NOTE: IF YOU ARE UNABLE TO OBTAIN WOUND SUPPLIES, CONTINUE TO USE THE SUPPLIES YOU HAVE AVAILABLE UNTIL YOU ARE ABLE TO REACH US. IT IS MOST IMPORTANT TO KEEP THE WOUND COVERED AT ALL TIMES.      Physician Signature:_______________________  Dr. Gordo Woo

## 2022-10-14 NOTE — WOUND CARE
[address]   Progress Note and Procedure Note   NO Procedure      Brenda Wray  MEDICAL RECORD NUMBER:  [id number]  AGE: 47 y.o. RACE WHITE/NON-  GENDER: male  : 1970  EPISODE DATE:  10/14/2022    Subjective:     Chief Complaint   Patient presents with    Wound Check     Right medial  ankle cluster         HISTORY of PRESENT ILLNESS HPI    Brenda Wray is a 47 y.o. male who presents today for wound/ulcer evaluation. History of Wound Context: h/o b/l medial ankle venous stasis type ulcers, had stripping done on LLE which solved issue there for past 7 years, but having issues now on RT LE.   Seeing Dr. Sandy Sommers 10-28-22 to see if he is a candidate for EVLT     Wound/Ulcer Pain Timing/Severity: intermittent and mild  Quality of pain: burning and dull  Severity:  3 / 10   Modifying Factors: Pain worsens with walking  Associated Signs/Symptoms: increased drainage and pain    Ulcer Identification:  Ulcer Type: venous    Contributing Factors: edema    Wound: 1         PAST MEDICAL HISTORY    Past Medical History:   Diagnosis Date    Hypertension     Testicular cancer ([facility] [address]     Venous (peripheral) insufficiency         PAST SURGICAL HISTORY    Past Surgical History:   Procedure Laterality Date    HX APPENDECTOMY      HX HERNIA REPAIR      HX UROLOGICAL  2000    testicular cancer surgery    VASCULAR SURGERY PROCEDURE UNLIST Left        FAMILY HISTORY    Family History   Problem Relation Age of Onset    Hypertension Mother     Hypertension Father     Diabetes Brother        SOCIAL HISTORY    Social History     Tobacco Use    Smoking status: Former    Smokeless tobacco: Never   Vaping Use    Vaping Use: Never used   Substance Use Topics    Alcohol use: Yes    Drug use: Never       ALLERGIES    No Known Allergies    MEDICATIONS    Current Outpatient Medications on File Prior to Encounter   Medication Sig Dispense Refill    testosterone (ANDROGEL) 20.25 mg/1.25 gram (1.62 %) gel Apply 61 mg to affected area daily. Max Daily Amount: 60.75 mg. 1 Each 3    chlorthalidone (HYGROTON) 25 mg tablet Take 1 Tablet by mouth daily. Indications: high blood pressure 90 Tablet 1    simvastatin (ZOCOR) 20 mg tablet Take 1 Tablet by mouth daily. 90 Tablet 1    olmesartan (BENICAR) 40 mg tablet Take 1 Tablet by mouth daily. 90 Tablet 1    carvediloL (COREG) 12.5 mg tablet Take 1 Tablet by mouth two (2) times a day. 180 Tablet 1     No current facility-administered medications on file prior to encounter. REVIEW OF SYSTEMS    A comprehensive review of systems was negative except for that written in the HPI. Objective:     Visit Vitals  /62 (BP 1 Location: Right upper arm, BP Patient Position: Sitting)   Pulse 83   Temp 97.8 °F (36.6 °C)   Resp 18   Ht 6' 1\" (1.854 m)   Wt 104.3 kg (230 lb)   BMI 30.34 kg/m²       Wt Readings from Last 3 Encounters:   10/14/22 104.3 kg (230 lb)   10/10/22 104.5 kg (230 lb 6.4 oz)   08/30/22 101.2 kg (223 lb 3.2 oz)       PHYSICAL EXAM    Pertinent items are noted in HPI. Vascular:  B/L LE  DP 1/4; PT 1/4  capillary fill time brisk, pitting edema is present, skin temperature is cool, varicosities are present. Dermatological:      Nails are debrided. There are extensive skin cracks at both heels. Skin is dry and scaly, exhibits hemosiderin deposition. There is no maceration of the interspaces of the feet b/l. Neurological:  DTR are present, protective sensation per 5.07 [employer] Chrissie monofilament is present, patient is AAOx3, mood is normal. Epicritic sensation is intact. Orthopedic:  B/L LE are symmetric, ROM of ankle, STJ, 1st MTPJ is limited, MMT 5 out of 5 for B/L LE. No pedal amputations    Constitutional: Pt is a well developed, thin, young,  male. Lymphatics: negative tenderness to palpation of neck/axillary/inguinal nodes.       Assessment:      Problem List Items Addressed This Visit          Circulatory Idiopathic chronic venous hypertension of right lower extremity with ulcer ([facility] [address] - Primary    Relevant Orders    INITIATE OUTPATIENT WOUND CARE PROTOCOL       Other    Non-pressure chronic ulcer of right ankle with fat layer exposed ([facility] [address]    Relevant Orders    INITIATE OUTPATIENT WOUND CARE PROTOCOL       Procedure Note  Indications:  Based on my examination of this patient's wound(s)/ulcer(s) today, debridement is not required to promote healing and evaluate the wound base. Wound Ankle Right;Medial #1 (Active)   Wound Image   10/14/22 1014   Dressing/Treatment Other (Comment) 10/14/22 1045   Wound Length (cm) 3.8 cm 10/14/22 1014   Wound Width (cm) 2.2 cm 10/14/22 1014   Wound Depth (cm) 0.1 cm 10/14/22 1014   Wound Surface Area (cm^2) 8.36 cm^2 10/14/22 1014   Wound Volume (cm^3) 0.836 cm^3 10/14/22 1014   Wound Assessment Pink/red;Purple/maroon; Other (Comment) 10/14/22 1014   Drainage Amount Moderate 10/14/22 1014   Drainage Description Serous 10/14/22 1014   Wound Odor None 10/14/22 1014   Prema-Wound/Incision Assessment Blanchable erythema 10/14/22 1014   Edges Flat/open edges 10/14/22 1014   Number of days: 0        Plan:   Q77.055 - RT ankle ulcer fat  I87.311 - Venous HTN with ulcer / inflammation RT LE    - Pt evaluated and tx.  - No s/s acute infection, reviewed with pt. - Will do GV, betamethasone, ioplex, Unna boot. - F/U with Dr. Cuevas Members.  - F/U at [address] [place] [address],[address] in one week or sooner prn further issues. Treatment Note please see attached Discharge Instructions    Written patient dismissal instructions given to patient or POA.          Electronically signed by Babar Le DPM on 10/14/2022 at 10:55 AM

## 2022-10-20 NOTE — DISCHARGE INSTRUCTIONS
Discharge Instructions for  [facility]  [address] [facility], [address] [facility] [address]  Telephone: [phone]     FAX [phone]    NAME:  Caldwell Merlin  YOB: 1970  DATE:10/21/22      Wound Cleansing:   Do not scrub or use excessive force. Cleanse wound prior to applying a clean dressing with:    [] Normal Saline   [] Keep Wound Dry in Shower      [] Wound Cleanser ()  [] May Shower at Discharge   [x] cleanse with [place] and [place] baby shampoo lather leave 2-3 then rinse with water, pat dry and redress wound. Dressings:           Wound Location right medial leg   Apply Primary Dressing:Betamethasone ointment,Ioplex unna's boot           Change dressing     [] Daily      [] Every Other Day   [] Three times per week  [x] Once a week   [] Do Not Change Dressing     [] Other:    Dietary:  [x] Diet as tolerated: [] Diabetic Diet:   [x] Increase Protein: examples ( Meat, cheese, eggs, greek yogurt, premier protein drink, fish, nuts )   [] Other:    Return Appointment:    [x] Return Appointment: With Dr. Luly King 1week       Electronically signed on 10/21/2022 at 10:31 AM     [address]: Should you experience any significant changes in your wound(s) or have questions about your wound care, please contact the [facility] Main at [address] [place] [address] 8:00 am - 4:30. If you need help with your wound outside these hours and cannot wait until we are again available, contact your PCP or go to the hospital emergency room. PLEASE NOTE: IF YOU ARE UNABLE TO OBTAIN WOUND SUPPLIES, CONTINUE TO USE THE SUPPLIES YOU HAVE AVAILABLE UNTIL YOU ARE ABLE TO REACH US. IT IS MOST IMPORTANT TO KEEP THE WOUND COVERED AT ALL TIMES.      Physician Signature:_______________________  Dr. Nela Chandra

## 2022-10-21 ENCOUNTER — HOSPITAL ENCOUNTER (OUTPATIENT)
Dept: WOUND CARE | Age: 52
Discharge: HOME | End: 2022-10-21
Payer: COMMERCIAL

## 2022-10-21 VITALS
RESPIRATION RATE: 18 BRPM | DIASTOLIC BLOOD PRESSURE: 80 MMHG | HEART RATE: 73 BPM | SYSTOLIC BLOOD PRESSURE: 123 MMHG | TEMPERATURE: 97.2 F

## 2022-10-21 DIAGNOSIS — L97.312 NON-PRESSURE CHRONIC ULCER OF RIGHT ANKLE WITH FAT LAYER EXPOSED (HCC): ICD-10-CM

## 2022-10-21 DIAGNOSIS — I87.311 IDIOPATHIC CHRONIC VENOUS HYPERTENSION OF RIGHT LOWER EXTREMITY WITH ULCER (HCC): Primary | ICD-10-CM

## 2022-10-21 DIAGNOSIS — L97.919 IDIOPATHIC CHRONIC VENOUS HYPERTENSION OF RIGHT LOWER EXTREMITY WITH ULCER (HCC): Primary | ICD-10-CM

## 2022-10-21 PROCEDURE — 74011000250 HC RX REV CODE- 250: Performed by: PODIATRIST

## 2022-10-21 PROCEDURE — 99213 OFFICE O/P EST LOW 20 MIN: CPT | Performed by: SPECIALIST

## 2022-10-21 RX ORDER — LIDOCAINE 40 MG/G
CREAM TOPICAL ONCE
Status: CANCELLED | OUTPATIENT
Start: 2022-10-21 | End: 2022-10-21

## 2022-10-21 RX ORDER — BACITRACIN ZINC AND POLYMYXIN B SULFATE 500; 1000 [USP'U]/G; [USP'U]/G
OINTMENT TOPICAL ONCE
Status: CANCELLED | OUTPATIENT
Start: 2022-10-21 | End: 2022-10-21

## 2022-10-21 RX ORDER — SILVER SULFADIAZINE 10 G/1000G
CREAM TOPICAL ONCE
Status: CANCELLED | OUTPATIENT
Start: 2022-10-21 | End: 2022-10-21

## 2022-10-21 RX ORDER — TRIAMCINOLONE ACETONIDE 1 MG/G
OINTMENT TOPICAL ONCE
Status: CANCELLED | OUTPATIENT
Start: 2022-10-21 | End: 2022-10-21

## 2022-10-21 RX ORDER — LIDOCAINE HYDROCHLORIDE 20 MG/ML
15 SOLUTION OROPHARYNGEAL ONCE
Status: CANCELLED | OUTPATIENT
Start: 2022-10-21 | End: 2022-10-21

## 2022-10-21 RX ORDER — LIDOCAINE HYDROCHLORIDE 20 MG/ML
JELLY TOPICAL ONCE
Status: CANCELLED | OUTPATIENT
Start: 2022-10-21 | End: 2022-10-21

## 2022-10-21 RX ORDER — BETAMETHASONE DIPROPIONATE 0.5 MG/G
OINTMENT TOPICAL ONCE
Status: CANCELLED | OUTPATIENT
Start: 2022-10-21 | End: 2022-10-21

## 2022-10-21 RX ORDER — GENTAMICIN SULFATE 1 MG/G
OINTMENT TOPICAL ONCE
Status: CANCELLED | OUTPATIENT
Start: 2022-10-21 | End: 2022-10-21

## 2022-10-21 RX ORDER — LIDOCAINE 50 MG/G
OINTMENT TOPICAL ONCE
Status: CANCELLED | OUTPATIENT
Start: 2022-10-21 | End: 2022-10-21

## 2022-10-21 RX ORDER — LIDOCAINE HYDROCHLORIDE 40 MG/ML
SOLUTION TOPICAL ONCE
Status: CANCELLED | OUTPATIENT
Start: 2022-10-21 | End: 2022-10-21

## 2022-10-21 RX ORDER — MUPIROCIN 20 MG/G
OINTMENT TOPICAL ONCE
Status: CANCELLED | OUTPATIENT
Start: 2022-10-21 | End: 2022-10-21

## 2022-10-21 RX ORDER — BACITRACIN 500 [USP'U]/G
OINTMENT TOPICAL ONCE
Status: CANCELLED | OUTPATIENT
Start: 2022-10-21 | End: 2022-10-21

## 2022-10-21 RX ORDER — CLOBETASOL PROPIONATE 0.5 MG/G
OINTMENT TOPICAL ONCE
Status: CANCELLED | OUTPATIENT
Start: 2022-10-21 | End: 2022-10-21

## 2022-10-21 RX ADMIN — Medication: at 10:17

## 2022-10-21 NOTE — WOUND CARE
10/21/22 1013   Anesthetic   Anesthetic 4% Lidocaine Liquid Topical   Right Leg Edema Point of Measurement   Leg circumference 36.5 cm   Ankle circumference 24 cm   RLE Peripheral Vascular    Capillary Refill Less than/equal to 3 seconds   Color Appropriate for race   Temperature Cool   Pedal Pulse Palpable   Wound Ankle Right;Medial #1   Date First Assessed/Time First Assessed: 10/14/22 1014   Present on Hospital Admission: Yes  Location: Ankle  Wound Location Orientation: Right;Medial  Wound Description: #1   Wound Image    Dressing Status Old drainage noted   Cleansed Soap and water   Wound Length (cm) 3 cm   Wound Width (cm) 2.3 cm   Wound Depth (cm) 0.1 cm   Wound Surface Area (cm^2) 6.9 cm^2   Change in Wound Size % 17.46   Wound Volume (cm^3) 0.69 cm^3   Wound Healing % 17   Wound Assessment Pink/red   Drainage Amount Moderate   Drainage Description Serous   Wound Odor None   Prema-Wound/Incision Assessment Intact   Edges Flat/open edges   Pain 1   Pain Scale 1 Numeric (0 - 10)   Pain Intensity 1 0   Visit Vitals  /80 (BP 1 Location: Left upper arm, BP Patient Position: Sitting)   Pulse 73   Temp 97.2 °F (36.2 °C)   Resp 18

## 2022-10-21 NOTE — WOUND CARE
[address]   Progress Note and Procedure Note   NO Procedure      Dotty Hair  MEDICAL RECORD NUMBER:  [id number]  AGE: 47 y.o. RACE WHITE/NON-  GENDER: male  : 1970  EPISODE DATE:  10/21/2022    Subjective:   No new problems reported  Mild pain in R ankle wound  Vascular appt next Friday  Chief Complaint   Patient presents with    Wound Check     Right medial ankle cluster         HISTORY of PRESENT ILLNESS HPI    Dotty Hair is a 47 y.o. male who presents today for wound/ulcer evaluation. History of Wound Context: RLE  Wound/Ulcer Pain Timing/Severity: mild  Quality of pain: aching  Severity:  1 / 10   Modifying Factors: None  Associated Signs/Symptoms: edema    Ulcer Identification:  Ulcer Type: venous    Contributing Factors: venous stasis    Wound: RLE         PAST MEDICAL HISTORY    Past Medical History:   Diagnosis Date    Hypertension     Testicular cancer ([address]     Venous (peripheral) insufficiency         PAST SURGICAL HISTORY    Past Surgical History:   Procedure Laterality Date    HX APPENDECTOMY  1985    HX HERNIA REPAIR      HX UROLOGICAL  2000    testicular cancer surgery    VASCULAR SURGERY PROCEDURE UNLIST Left        FAMILY HISTORY    Family History   Problem Relation Age of Onset    Hypertension Mother     Hypertension Father     Diabetes Brother        SOCIAL HISTORY    Social History     Tobacco Use    Smoking status: Former    Smokeless tobacco: Never   Vaping Use    Vaping Use: Never used   Substance Use Topics    Alcohol use: Yes    Drug use: Never       ALLERGIES    No Known Allergies    MEDICATIONS    Current Outpatient Medications on File Prior to Encounter   Medication Sig Dispense Refill    chlorthalidone (HYGROTON) 25 mg tablet Take 1 Tablet by mouth daily. Indications: high blood pressure 90 Tablet 1    olmesartan (BENICAR) 40 mg tablet Take 1 Tablet by mouth daily.  90 Tablet 1    carvediloL (COREG) 12.5 mg tablet Take 1 Tablet by mouth two (2) times a day. 180 Tablet 1    testosterone (ANDROGEL) 20.25 mg/1.25 gram (1.62 %) gel Apply 61 mg to affected area daily. Max Daily Amount: 60.75 mg. 1 Each 3    simvastatin (ZOCOR) 20 mg tablet Take 1 Tablet by mouth daily. 90 Tablet 1     No current facility-administered medications on file prior to encounter. REVIEW OF SYSTEMS    Pertinent items are noted in HPI. Objective:     Visit Vitals  /80 (BP 1 Location: Left upper arm, BP Patient Position: Sitting)   Pulse 73   Temp 97.2 °F (36.2 °C)   Resp 18       Wt Readings from Last 3 Encounters:   10/14/22 104.3 kg (230 lb)   10/10/22 104.5 kg (230 lb 6.4 oz)   08/30/22 101.2 kg (223 lb 3.2 oz)       PHYSICAL EXAM  R medial ankle wounds very small, no infection  Pertinent items are noted in HPI. Assessment:    Almost healed  Problem List Items Addressed This Visit       Idiopathic chronic venous hypertension of right lower extremity with ulcer ([address] - Primary    Relevant Medications    lidocaine (ALOCANE) 4 % topical gel (Completed) (Start on 10/21/2022 11:00 AM)    Other Relevant Orders    INITIATE OUTPATIENT WOUND CARE PROTOCOL    Non-pressure chronic ulcer of right ankle with fat layer exposed ([address]    Relevant Medications    lidocaine (ALOCANE) 4 % topical gel (Completed) (Start on 10/21/2022 11:00 AM)    Other Relevant Orders    INITIATE OUTPATIENT WOUND CARE PROTOCOL       Procedure Note  Indications:  Based on my examination of this patient's wound(s)/ulcer(s) today, debridement is not required to promote healing and evaluate the wound base.     Wound Ankle Right;Medial #1 (Active)   Wound Image   10/21/22 1013   Dressing Status Old drainage noted 10/21/22 1013   Cleansed Soap and water 10/21/22 1013   Dressing/Treatment Other (Comment) 10/14/22 1045   Wound Length (cm) 3 cm 10/21/22 1013   Wound Width (cm) 2.3 cm 10/21/22 1013   Wound Depth (cm) 0.1 cm 10/21/22 1013   Wound Surface Area (cm^2) 6.9 cm^2 10/21/22 1013   Change in Wound Size % 17.46 10/21/22 1013   Wound Volume (cm^3) 0.69 cm^3 10/21/22 1013   Wound Healing % 17 10/21/22 1013   Wound Assessment Pink/red 10/21/22 1013   Drainage Amount Moderate 10/21/22 1013   Drainage Description Serous 10/21/22 1013   Wound Odor None 10/21/22 1013   Prema-Wound/Incision Assessment Intact 10/21/22 1013   Edges Flat/open edges 10/21/22 1013   Number of days: 7        Plan:   Continue betamethasone, ioplex, Unna  Appt with vascular next Friday    Treatment Note please see attached Discharge Instructions    Written patient dismissal instructions given to patient or POA.          Electronically signed by Fawad Ponce MD on 10/21/2022 at 10:28 AM

## 2022-10-21 NOTE — WOUND CARE
[employer] Application   Below Knee    NAME:  Keyonna Cárdenas  YOB: 1970  MEDICAL RECORD NUMBER:  [id number]  DATE:  October 21, 2022     10/21/22 1034   Right Leg Edema Point of Measurement   Compression Therapy Unna boot   Wound Ankle Right;Medial #1   Date First Assessed/Time First Assessed: 10/14/22 1014   Present on Hospital Admission: Yes  Location: Ankle  Wound Location Orientation: Right;Medial  Wound Description: #1   Dressing Status New dressing applied   Dressing/Treatment Other (Comment)  (betamethasoneperi. iopex, gauze.)     Remove old Unna Boot or Boots. Applied moisturizing agent to dry skin as needed. Appied primary and secondary dressing as ordered. Applied Unna roll from toes to knee overlapping each time. Applied ace wrap or coban from toes to below the knee. Secured with tape and/or metal clips covered with tape. Instructed patient/caregiver to keep dressing dry and intact. DO NOT REMOVE DRESSING. Instructed pt/family/caregiver to report excessive draining, loose bandage, wet dressing, severe pain or tingling in toes. Applied [employer] dressing below the knee to bilateral lower legs. Applied [place] dressing below the knee to left lower leg. Unna Boot(s) were applied per  Guidelines.     Response to treatment: Well tolerated by patient      Electronically signed by Janes Valera RN on 10/21/2022 at 10:37 AM

## 2022-10-27 ENCOUNTER — HOSPITAL ENCOUNTER (OUTPATIENT)
Dept: CT IMAGING | Age: 52
Discharge: HOME | End: 2022-10-27
Attending: INTERNAL MEDICINE
Payer: COMMERCIAL

## 2022-10-27 DIAGNOSIS — D64.9 ANEMIA, UNSPECIFIED TYPE: ICD-10-CM

## 2022-10-27 DIAGNOSIS — R16.1 SPLENOMEGALY: ICD-10-CM

## 2022-10-27 PROCEDURE — 74177 CT ABD & PELVIS W/CONTRAST: CPT

## 2022-10-27 PROCEDURE — 74011000636 HC RX REV CODE- 636: Performed by: INTERNAL MEDICINE

## 2022-10-27 RX ADMIN — IOPAMIDOL 100 ML: 755 INJECTION, SOLUTION INTRAVENOUS at 09:18

## 2022-10-28 ENCOUNTER — HOSPITAL ENCOUNTER (OUTPATIENT)
Dept: WOUND CARE | Age: 52
Discharge: HOME | End: 2022-10-28
Payer: COMMERCIAL

## 2022-10-28 VITALS
DIASTOLIC BLOOD PRESSURE: 63 MMHG | TEMPERATURE: 98.2 F | RESPIRATION RATE: 16 BRPM | SYSTOLIC BLOOD PRESSURE: 142 MMHG | HEART RATE: 75 BPM

## 2022-10-28 DIAGNOSIS — L97.919 IDIOPATHIC CHRONIC VENOUS HYPERTENSION OF RIGHT LOWER EXTREMITY WITH ULCER (HCC): Primary | ICD-10-CM

## 2022-10-28 DIAGNOSIS — I87.311 IDIOPATHIC CHRONIC VENOUS HYPERTENSION OF RIGHT LOWER EXTREMITY WITH ULCER (HCC): Primary | ICD-10-CM

## 2022-10-28 DIAGNOSIS — L97.312 NON-PRESSURE CHRONIC ULCER OF RIGHT ANKLE WITH FAT LAYER EXPOSED (HCC): ICD-10-CM

## 2022-10-28 PROBLEM — R91.1 LUNG NODULE: Status: ACTIVE | Noted: 2022-10-28

## 2022-10-28 PROCEDURE — 99213 OFFICE O/P EST LOW 20 MIN: CPT | Performed by: PODIATRIST

## 2022-10-28 PROCEDURE — 74011000250 HC RX REV CODE- 250: Performed by: PODIATRIST

## 2022-10-28 PROCEDURE — 29580 STRAPPING UNNA BOOT: CPT

## 2022-10-28 RX ORDER — LIDOCAINE HYDROCHLORIDE 40 MG/ML
SOLUTION TOPICAL ONCE
Status: CANCELLED | OUTPATIENT
Start: 2022-10-28 | End: 2022-10-28

## 2022-10-28 RX ORDER — GENTAMICIN SULFATE 1 MG/G
OINTMENT TOPICAL ONCE
Status: CANCELLED | OUTPATIENT
Start: 2022-10-28 | End: 2022-10-28

## 2022-10-28 RX ORDER — CLOBETASOL PROPIONATE 0.5 MG/G
OINTMENT TOPICAL ONCE
Status: CANCELLED | OUTPATIENT
Start: 2022-10-28 | End: 2022-10-28

## 2022-10-28 RX ORDER — LIDOCAINE HYDROCHLORIDE 20 MG/ML
JELLY TOPICAL ONCE
Status: CANCELLED | OUTPATIENT
Start: 2022-10-28 | End: 2022-10-28

## 2022-10-28 RX ORDER — LIDOCAINE HYDROCHLORIDE 20 MG/ML
15 SOLUTION OROPHARYNGEAL ONCE
Status: CANCELLED | OUTPATIENT
Start: 2022-10-28 | End: 2022-10-28

## 2022-10-28 RX ORDER — LIDOCAINE 40 MG/G
CREAM TOPICAL ONCE
Status: CANCELLED | OUTPATIENT
Start: 2022-10-28 | End: 2022-10-28

## 2022-10-28 RX ORDER — BACITRACIN 500 [USP'U]/G
OINTMENT TOPICAL ONCE
Status: CANCELLED | OUTPATIENT
Start: 2022-10-28 | End: 2022-10-28

## 2022-10-28 RX ORDER — BACITRACIN ZINC AND POLYMYXIN B SULFATE 500; 1000 [USP'U]/G; [USP'U]/G
OINTMENT TOPICAL ONCE
Status: CANCELLED | OUTPATIENT
Start: 2022-10-28 | End: 2022-10-28

## 2022-10-28 RX ORDER — SILVER SULFADIAZINE 10 G/1000G
CREAM TOPICAL ONCE
Status: CANCELLED | OUTPATIENT
Start: 2022-10-28 | End: 2022-10-28

## 2022-10-28 RX ORDER — BETAMETHASONE DIPROPIONATE 0.5 MG/G
OINTMENT TOPICAL ONCE
Status: CANCELLED | OUTPATIENT
Start: 2022-10-28 | End: 2022-10-28

## 2022-10-28 RX ORDER — MUPIROCIN 20 MG/G
OINTMENT TOPICAL ONCE
Status: CANCELLED | OUTPATIENT
Start: 2022-10-28 | End: 2022-10-28

## 2022-10-28 RX ORDER — TRIAMCINOLONE ACETONIDE 1 MG/G
OINTMENT TOPICAL ONCE
Status: CANCELLED | OUTPATIENT
Start: 2022-10-28 | End: 2022-10-28

## 2022-10-28 RX ORDER — LIDOCAINE 50 MG/G
OINTMENT TOPICAL ONCE
Status: CANCELLED | OUTPATIENT
Start: 2022-10-28 | End: 2022-10-28

## 2022-10-28 RX ADMIN — Medication: at 11:03

## 2022-10-28 NOTE — DISCHARGE INSTRUCTIONS
Discharge Instructions for  [facility]  [address], [address] [facility] [address]  Telephone: [phone]     FAX [phone]    NAME:  Bernadette Smith  YOB: 1970  DATE:10/21/22      Wound Cleansing:   Do not scrub or use excessive force. Cleanse wound prior to applying a clean dressing with:    [] Normal Saline   [] Keep Wound Dry in Shower      [] Wound Cleanser ()  [] May Shower at Discharge   [x] cleanse with [place] and [place] baby shampoo lather leave 2-3 then rinse with water, pat dry and redress wound. Dressings:           Wound Location right medial leg   Apply Primary Dressing: Betamethasone ointment,Ioplex unna's boot           Change dressing     [] Daily      [] Every Other Day   [] Three times per week  [x] Once a week   [] Do Not Change Dressing     [] Other:    Dietary:  [x] Diet as tolerated: [] Diabetic Diet:   [x] Increase Protein: examples ( Meat, cheese, eggs, greek yogurt, premier protein drink, fish, nuts )   [] Other:    Return Appointment:    [x] Return Appointment: With Dr. Dawson Lockett 1week       Electronically signed on 10/21/2022 at 10:31 AM     [address]: Should you experience any significant changes in your wound(s) or have questions about your wound care, please contact the [facility] Main at [address] [place] [address] 8:00 am - 4:30. If you need help with your wound outside these hours and cannot wait until we are again available, contact your PCP or go to the hospital emergency room. PLEASE NOTE: IF YOU ARE UNABLE TO OBTAIN WOUND SUPPLIES, CONTINUE TO USE THE SUPPLIES YOU HAVE AVAILABLE UNTIL YOU ARE ABLE TO REACH US. IT IS MOST IMPORTANT TO KEEP THE WOUND COVERED AT ALL TIMES.      Physician Signature:_______________________  Dr. Taran Pardo

## 2022-10-28 NOTE — PROGRESS NOTES
10/28/22 1058   Right Leg Edema Point of Measurement   Leg circumference 35 cm   Ankle circumference 24 cm   RLE Peripheral Vascular    Capillary Refill Less than/equal to 3 seconds   Color Red   Temperature Cool   Pedal Pulse Palpable   Wound Ankle Right;Medial #1   Date First Assessed/Time First Assessed: 10/14/22 1014   Present on Hospital Admission: Yes  Location: Ankle  Wound Location Orientation: Right;Medial  Wound Description: #1   Wound Image    Dressing Status   ([place])   Cleansed Cleansed with saline   Wound Length (cm) 1.5 cm   Wound Width (cm) 1.3 cm   Wound Depth (cm) 0.3 cm   Wound Surface Area (cm^2) 1.95 cm^2   Change in Wound Size % 76.67   Wound Volume (cm^3) 0.585 cm^3   Wound Healing % 30   Wound Assessment [place]/red;[place]   Drainage Amount Small   Drainage Description Serous   Wound Odor None   Prema-Wound/Incision Assessment Blanchable erythema; Hyperpigmented   Edges Flat/open edges   Wound Thickness Description Partial thickness   Visit Vitals  BP (!) 142/63 (BP 1 Location: Right arm, BP Patient Position: At rest)   Pulse 75   Temp 98.2 °F (36.8 °C)   Resp 16

## 2022-10-28 NOTE — WOUND CARE
10/28/22 1136   Right Leg Edema Point of Measurement   Compression Therapy Unna boot   Wound Ankle Right;Medial #1   Date First Assessed/Time First Assessed: 10/14/22 1014   Present on Hospital Admission: Yes  Location: Ankle  Wound Location Orientation: Right;Medial  Wound Description: #1   Dressing/Treatment   (betamethasone, ioplex UNNAS boot)   Discharge Condition: Stable     Pain: 3    Ambulatory Status: Walking    Discharge Destination: Home     Transportation: Car    Accompanied by: Self     Discharge instructions reviewed with Patient and copy or written instructions have been provided. All questions/concerns have been addressed at this time.

## 2022-10-28 NOTE — WOUND CARE
[address]   Progress Note and Procedure Note   NO Procedure      Autumn Mistry  MEDICAL RECORD NUMBER:  [id number]  AGE: 47 y.o. RACE WHITE/NON-  GENDER: male  : 1970  EPISODE DATE:  10/28/2022    Subjective:     Chief Complaint   Patient presents with    Wound Check     Right lower leg         HISTORY of PRESENT ILLNESS HPI    Autumn Mistry is a 47 y.o. male who presents today for wound/ulcer evaluation. History of Wound Context: h/o b/l medial ankle venous stasis type ulcers, had stripping done on LLE which solved issue there for past 7 years, but having issues now on RT LE.   Seeing Dr. Garrett Mcardle 10-28-22 to see if he is a candidate for EVLT     Wound/Ulcer Pain Timing/Severity: intermittent and mild  Quality of pain: burning and dull  Severity:  3 / 10   Modifying Factors: Pain worsens with walking  Associated Signs/Symptoms: increased drainage and pain    Ulcer Identification:  Ulcer Type: venous    Contributing Factors: edema    Wound: 1         PAST MEDICAL HISTORY    Past Medical History:   Diagnosis Date    Hypertension     Testicular cancer ([facility] [address]     Venous (peripheral) insufficiency         PAST SURGICAL HISTORY    Past Surgical History:   Procedure Laterality Date    HX APPENDECTOMY      HX HERNIA REPAIR      HX UROLOGICAL  2000    testicular cancer surgery    VASCULAR SURGERY PROCEDURE UNLIST Left        FAMILY HISTORY    Family History   Problem Relation Age of Onset    Hypertension Mother     Hypertension Father     Diabetes Brother        SOCIAL HISTORY    Social History     Tobacco Use    Smoking status: Former    Smokeless tobacco: Never   Vaping Use    Vaping Use: Never used   Substance Use Topics    Alcohol use: Yes    Drug use: Never       ALLERGIES    No Known Allergies    MEDICATIONS    Current Outpatient Medications on File Prior to Encounter   Medication Sig Dispense Refill    testosterone (ANDROGEL) 20.25 mg/1.25 gram (1.62 %) gel Apply 61 mg to affected area daily. Max Daily Amount: 60.75 mg. 1 Each 3    chlorthalidone (HYGROTON) 25 mg tablet Take 1 Tablet by mouth daily. Indications: high blood pressure 90 Tablet 1    simvastatin (ZOCOR) 20 mg tablet Take 1 Tablet by mouth daily. 90 Tablet 1    olmesartan (BENICAR) 40 mg tablet Take 1 Tablet by mouth daily. 90 Tablet 1    carvediloL (COREG) 12.5 mg tablet Take 1 Tablet by mouth two (2) times a day. 180 Tablet 1     No current facility-administered medications on file prior to encounter. REVIEW OF SYSTEMS    A comprehensive review of systems was negative except for that written in the HPI. Objective:     Visit Vitals  BP (!) 142/63 (BP 1 Location: Right arm, BP Patient Position: At rest)   Pulse 75   Temp 98.2 °F (36.8 °C)   Resp 16       Wt Readings from Last 3 Encounters:   10/14/22 104.3 kg (230 lb)   10/10/22 104.5 kg (230 lb 6.4 oz)   08/30/22 101.2 kg (223 lb 3.2 oz)       PHYSICAL EXAM    Pertinent items are noted in HPI. Vascular:  B/L LE  DP 1/4; PT 1/4  capillary fill time brisk, pitting edema is present, skin temperature is cool, varicosities are present. Dermatological:      Nails are debrided. There are extensive skin cracks at both heels. Skin is dry and scaly, exhibits hemosiderin deposition. There is no maceration of the interspaces of the feet b/l. Neurological:  DTR are present, protective sensation per 5.07 [place] Chrissie monofilament is present, patient is AAOx3, mood is normal. Epicritic sensation is intact. Orthopedic:  B/L LE are symmetric, ROM of ankle, STJ, 1st MTPJ is limited, MMT 5 out of 5 for B/L LE. No pedal amputations    Constitutional: Pt is a well developed, thin, young,  male. Lymphatics: negative tenderness to palpation of neck/axillary/inguinal nodes.        10/28/22 1058   Right Leg Edema Point of Measurement   Leg circumference 35 cm   Ankle circumference 24 cm   RLE Peripheral Vascular    Capillary Refill Less than/equal to 3 seconds   Color Red   Temperature Cool   Pedal Pulse Palpable   Wound Ankle Right;Medial #1   Date First Assessed/Time First Assessed: 10/14/22 1014   Present on Hospital Admission: Yes  Location: Ankle  Wound Location Orientation: Right;Medial  Wound Description: #1   Wound Image    Dressing Status    (BIBIANA)   Cleansed Cleansed with saline   Wound Length (cm) 1.5 cm   Wound Width (cm) 1.3 cm   Wound Depth (cm) 0.3 cm   Wound Surface Area (cm^2) 1.95 cm^2   Change in Wound Size % 76.67   Wound Volume (cm^3) 0.585 cm^3   Wound Healing % 30   Wound Assessment Tea/red;Slough   Drainage Amount Small   Drainage Description Serous   Wound Odor None   Prema-Wound/Incision Assessment Blanchable erythema; Hyperpigmented   Edges Flat/open edges   Wound Thickness Description Partial thickness   Visit Vitals  BP (!) 142/63 (BP 1 Location: Right arm, BP Patient Position: At rest)   Pulse 75   Temp 98.2 °F (36.8 °C)   Resp 16       Assessment:      Problem List Items Addressed This Visit          Circulatory    Idiopathic chronic venous hypertension of right lower extremity with ulcer (HCC) - Primary    Relevant Medications    lidocaine (ALOCANE) 4 % topical gel (Completed)    Other Relevant Orders    INITIATE OUTPATIENT WOUND CARE PROTOCOL       Other    Non-pressure chronic ulcer of right ankle with fat layer exposed (HCC)    Relevant Medications    lidocaine (ALOCANE) 4 % topical gel (Completed)    Other Relevant Orders    INITIATE OUTPATIENT WOUND CARE PROTOCOL       Procedure Note  Indications:  Based on my examination of this patient's wound(s)/ulcer(s) today, debridement is not required to promote healing and evaluate the wound base. Plan:   Y01.469 - RT ankle ulcer fat  I87.311 - Venous HTN with ulcer / inflammation RT LE    - Pt evaluated and tx.  - Wound improved and less painful.  - No s/s acute infection, reviewed with pt. - Will do GV, betamethasone, ioplex, Unna boot.   - F/U with Dr. Thelma Logan, going for duplex 11/1/22 to see if he is a surgical candidate.  - F/U at [address] [place] [address],[address] in one week or sooner prn further issues. Treatment Note please see attached Discharge Instructions    Written patient dismissal instructions given to patient or POA.          Electronically signed by Lila Ballard DPM on 10/28/2022 at 10:55 AM

## 2022-11-04 ENCOUNTER — HOSPITAL ENCOUNTER (OUTPATIENT)
Dept: WOUND CARE | Age: 52
Discharge: HOME | End: 2022-11-04
Payer: COMMERCIAL

## 2022-11-04 VITALS
RESPIRATION RATE: 15 BRPM | TEMPERATURE: 98.1 F | SYSTOLIC BLOOD PRESSURE: 121 MMHG | DIASTOLIC BLOOD PRESSURE: 77 MMHG | HEART RATE: 76 BPM

## 2022-11-04 DIAGNOSIS — L97.312 NON-PRESSURE CHRONIC ULCER OF RIGHT ANKLE WITH FAT LAYER EXPOSED (HCC): ICD-10-CM

## 2022-11-04 DIAGNOSIS — L97.919 IDIOPATHIC CHRONIC VENOUS HYPERTENSION OF RIGHT LOWER EXTREMITY WITH ULCER (HCC): Primary | ICD-10-CM

## 2022-11-04 DIAGNOSIS — I87.311 IDIOPATHIC CHRONIC VENOUS HYPERTENSION OF RIGHT LOWER EXTREMITY WITH ULCER (HCC): Primary | ICD-10-CM

## 2022-11-04 PROCEDURE — 29580 STRAPPING UNNA BOOT: CPT

## 2022-11-04 PROCEDURE — 74011000250 HC RX REV CODE- 250: Performed by: PODIATRIST

## 2022-11-04 RX ORDER — TRIAMCINOLONE ACETONIDE 1 MG/G
OINTMENT TOPICAL ONCE
Status: CANCELLED | OUTPATIENT
Start: 2022-11-04 | End: 2022-11-04

## 2022-11-04 RX ORDER — LIDOCAINE HYDROCHLORIDE 20 MG/ML
JELLY TOPICAL ONCE
Status: CANCELLED | OUTPATIENT
Start: 2022-11-04 | End: 2022-11-04

## 2022-11-04 RX ORDER — MUPIROCIN 20 MG/G
OINTMENT TOPICAL ONCE
Status: CANCELLED | OUTPATIENT
Start: 2022-11-04 | End: 2022-11-04

## 2022-11-04 RX ORDER — BETAMETHASONE DIPROPIONATE 0.5 MG/G
OINTMENT TOPICAL ONCE
Status: CANCELLED | OUTPATIENT
Start: 2022-11-04 | End: 2022-11-04

## 2022-11-04 RX ORDER — LIDOCAINE 40 MG/G
CREAM TOPICAL ONCE
Status: CANCELLED | OUTPATIENT
Start: 2022-11-04 | End: 2022-11-04

## 2022-11-04 RX ORDER — BACITRACIN 500 [USP'U]/G
OINTMENT TOPICAL ONCE
Status: CANCELLED | OUTPATIENT
Start: 2022-11-04 | End: 2022-11-04

## 2022-11-04 RX ORDER — BACITRACIN ZINC AND POLYMYXIN B SULFATE 500; 1000 [USP'U]/G; [USP'U]/G
OINTMENT TOPICAL ONCE
Status: CANCELLED | OUTPATIENT
Start: 2022-11-04 | End: 2022-11-04

## 2022-11-04 RX ORDER — SILVER SULFADIAZINE 10 G/1000G
CREAM TOPICAL ONCE
Status: CANCELLED | OUTPATIENT
Start: 2022-11-04 | End: 2022-11-04

## 2022-11-04 RX ORDER — CLOBETASOL PROPIONATE 0.5 MG/G
OINTMENT TOPICAL ONCE
Status: CANCELLED | OUTPATIENT
Start: 2022-11-04 | End: 2022-11-04

## 2022-11-04 RX ORDER — GENTAMICIN SULFATE 1 MG/G
OINTMENT TOPICAL ONCE
Status: CANCELLED | OUTPATIENT
Start: 2022-11-04 | End: 2022-11-04

## 2022-11-04 RX ORDER — LIDOCAINE 50 MG/G
OINTMENT TOPICAL ONCE
Status: CANCELLED | OUTPATIENT
Start: 2022-11-04 | End: 2022-11-04

## 2022-11-04 RX ORDER — LIDOCAINE HYDROCHLORIDE 40 MG/ML
SOLUTION TOPICAL ONCE
Status: CANCELLED | OUTPATIENT
Start: 2022-11-04 | End: 2022-11-04

## 2022-11-04 RX ORDER — LIDOCAINE HYDROCHLORIDE 20 MG/ML
15 SOLUTION OROPHARYNGEAL ONCE
Status: CANCELLED | OUTPATIENT
Start: 2022-11-04 | End: 2022-11-04

## 2022-11-04 RX ADMIN — Medication: at 08:17

## 2022-11-04 NOTE — WOUND CARE
11/04/22 0849   Right Leg Edema Point of Measurement   Compression Therapy Unna boot   Wound Ankle Right;Medial #1   Date First Assessed/Time First Assessed: 10/14/22 1014   Present on Hospital Admission: Yes  Location: Ankle  Wound Location Orientation: Right;Medial  Wound Description: #1   Dressing/Treatment Gauze dressing/dressing sponge; Other (Comment)  (betamethasone ointment to wound)   Discharge Condition: Stable     Pain: 0    Ambulatory Status: Walking    Discharge Destination: Home     Transportation: Car    Accompanied by: Self     Discharge instructions reviewed with Patient and copy or written instructions have been provided. All questions/concerns have been addressed at this time. [place] Application   Below Knee    NAME:  Meghana Abad  YOB: 1970  MEDICAL RECORD NUMBER:  [id number]  DATE:  11/4/2022    Remove old Unna Boot or Boots. Applied moisturizing agent to dry skin as needed. Appied primary and secondary dressing as ordered. Applied Unna roll from toes to knee overlapping each time. Applied ace wrap or coban from toes to below the knee. Secured with tape and/or metal clips covered with tape. Instructed patient/caregiver to keep dressing dry and intact. DO NOT REMOVE DRESSING. Instructed pt/family/caregiver to report excessive draining, loose bandage, wet dressing, severe pain or tingling in toes. Applied [employer] dressing below the knee to bilateral lower legs. Applied [place] dressing below the knee to right lower leg. Unna Boot(s) were applied per  Guidelines.     Response to treatment: Well tolerated by patient      Electronically signed by Humaira Skaggs RN on 11/4/2022 at 8:50 AM       Electronically signed by Humaira Skaggs RN on 11/4/2022 at 8:50 AM

## 2022-11-04 NOTE — WOUND CARE
[address]   Progress Note and Procedure Note   NO Procedure      Keyonna Cárdenas  MEDICAL RECORD NUMBER:  [id number]  AGE: 47 y.o. RACE WHITE/NON-  GENDER: male  : 1970  EPISODE DATE:  2022    Subjective:     Chief Complaint   Patient presents with    Follow-up     Right ankle wound          HISTORY of PRESENT ILLNESS HPI    Keyonna Cárdenas is a 47 y.o. male who presents today for wound/ulcer evaluation. History of Wound Context: h/o b/l medial ankle venous stasis type ulcers, had stripping done on LLE which solved issue there for past 7 years, but having issues now on RT LE. Wound/Ulcer Pain Timing/Severity: none  Quality of pain: n/a  Severity:  0 / 10   Modifying Factors: None  Associated Signs/Symptoms: erythema    Ulcer Identification:  Ulcer Type: venous    Contributing Factors: edema    Wound: right medial ankle         PAST MEDICAL HISTORY    Past Medical History:   Diagnosis Date    Hypertension     Testicular cancer ([address]     Venous (peripheral) insufficiency         PAST SURGICAL HISTORY    Past Surgical History:   Procedure Laterality Date    HX APPENDECTOMY      HX HERNIA REPAIR      HX UROLOGICAL  2000    testicular cancer surgery    VASCULAR SURGERY PROCEDURE UNLIST Left        FAMILY HISTORY    Family History   Problem Relation Age of Onset    Hypertension Mother     Hypertension Father     Diabetes Brother        SOCIAL HISTORY    Social History     Tobacco Use    Smoking status: Former    Smokeless tobacco: Never   Vaping Use    Vaping Use: Never used   Substance Use Topics    Alcohol use: Yes    Drug use: Never       ALLERGIES    No Known Allergies    MEDICATIONS    Current Outpatient Medications on File Prior to Encounter   Medication Sig Dispense Refill    testosterone (ANDROGEL) 20.25 mg/1.25 gram (1.62 %) gel Apply 61 mg to affected area daily.  Max Daily Amount: 60.75 mg. 1 Each 3    chlorthalidone (HYGROTON) 25 mg tablet Take 1 Tablet by mouth daily. Indications: high blood pressure 90 Tablet 1    simvastatin (ZOCOR) 20 mg tablet Take 1 Tablet by mouth daily. 90 Tablet 1    olmesartan (BENICAR) 40 mg tablet Take 1 Tablet by mouth daily. 90 Tablet 1    carvediloL (COREG) 12.5 mg tablet Take 1 Tablet by mouth two (2) times a day. 180 Tablet 1     No current facility-administered medications on file prior to encounter. REVIEW OF SYSTEMS    Consitutional: no weight loss, night sweats, fatigue / malaise / lethargy. Musculoskeletal: no joint / extremity pain, misalignment, stiffness, decreased ROM, crepitus. Integument: No pruritis, rashes, lesions, right ankle wound. Psychiatric: No depression, anxiety, paranoia    Objective:     Visit Vitals  /77 (BP 1 Location: Left upper arm)   Pulse 76   Temp 98.1 °F (36.7 °C)   Resp 15       Wt Readings from Last 3 Encounters:   10/14/22 104.3 kg (230 lb)   10/10/22 104.5 kg (230 lb 6.4 oz)   08/30/22 101.2 kg (223 lb 3.2 oz)       PHYSICAL EXAM    Vascular:  B/L LE  DP 1/4; PT 1/4  capillary fill time brisk, mild spongy edema is present, skin temperature is cool, varicosities are present. Dermatological:     Wound: 1  Location: right ankle  Measurements: per RN note  Margins: hemosiderosis, venous skin changes  Drainage: scant serous  Odor: none  Wound base: fibrotic   Lymphangitic streaking? No.  Undermining? No.  Sinus tracts? No.  Exposed bone? No.  Subcutaneous crepitation on palpation? No.     Nails are WNL. Skin is dry. There is no maceration of the interspaces of the feet b/l. Neurological:  DTR are present, protective sensation per 5.07 [place] Chrissie monofilament is intact, patient is AAOx3, mood is normal. Epicritic sensation is intact. Orthopedic:  B/L LE are symmetric, ROM of ankle, STJ, 1st MTPJ is limited, MMT 5 out of 5 for B/L LE. No pedal amputations. Constitutional: Pt is a well developed, middle-aged male.      Lymphatics: negative tenderness to palpation of neck/axillary/inguinal nodes. Assessment:      Problem List Items Addressed This Visit          Circulatory    Idiopathic chronic venous hypertension of right lower extremity with ulcer ([address] - Primary    Relevant Medications    lidocaine (ALOCANE) 4 % topical gel (Completed)    Other Relevant Orders    INITIATE OUTPATIENT WOUND CARE PROTOCOL       Other    Non-pressure chronic ulcer of right ankle with fat layer exposed ([address]    Relevant Medications    lidocaine (ALOCANE) 4 % topical gel (Completed)    Other Relevant Orders    INITIATE OUTPATIENT WOUND CARE PROTOCOL       Procedure Note  Indications:  Based on my examination of this patient's wound(s)/ulcer(s) today, debridement is not required to promote healing and evaluate the wound base. Wound Ankle Right;Medial #1 (Active)   Wound Image   11/04/22 0816   Wound Etiology Venous 11/04/22 0816   Dressing Status Old drainage noted 11/04/22 0816   Cleansed Cleansed with saline 11/04/22 0816   Dressing/Treatment Gauze dressing/dressing sponge; Other (Comment) 11/04/22 0849   Wound Length (cm) 0.1 cm 11/04/22 0816   Wound Width (cm) 0.3 cm 11/04/22 0816   Wound Depth (cm) 0.1 cm 11/04/22 0816   Wound Surface Area (cm^2) 0.03 cm^2 11/04/22 0816   Change in Wound Size % 99.64 11/04/22 0816   Wound Volume (cm^3) 0.003 cm^3 11/04/22 0816   Wound Healing % 100 11/04/22 0816   Post-Procedure Length (cm) 0.1 cm 11/04/22 0841   Post-Procedure Width (cm) 0.3 cm 11/04/22 0841   Post-Procedure Depth (cm) 0.1 cm 11/04/22 0841   Post-Procedure Surface Area (cm^2) 0.03 cm^2 11/04/22 0841   Post-Procedure Volume (cm^3) 0.003 cm^3 11/04/22 0841   Wound Assessment [facility] 11/04/22 0816   Drainage Amount Scant 11/04/22 0816   Drainage Description Serous 11/04/22 0816   Wound Odor None 11/04/22 0816   Prema-Wound/Incision Assessment Blanchable erythema; Hemosiderin staining (brown yellow) 11/04/22 0816   Edges Flat/open edges 11/04/22 0816   Wound Thickness Description Partial thickness 11/04/22 0816   Number of days: 21        Plan:     L97.312 - RT ankle ulcer fat    I87.311 - Venous HTN with ulcer / inflammation RT LE    - Pt evaluated and tx.  - Wound improved and less painful.  - No s/s acute infection, reviewed with pt. - Will do betamethasone and Unna boot. - Pt scheduled for vein oblation with Dr. Dante Machuca on 11/16/22.  - F/U at [address] [place] [address],[address] in one week or sooner prn further issues. Treatment Note please see attached Discharge Instructions    Written patient dismissal instructions given to patient or POA.          Electronically signed by Prashant Gaytan DPM on 11/4/2022 at 9:00 AM

## 2022-11-04 NOTE — DISCHARGE INSTRUCTIONS
Discharge Instructions for  [facility]  [address] [facility], [address] [facility]  Telephone: [phone]     FAX [phone]    NAME:  Kim Clark  YOB: 1970  DATE:11/4/22      Wound Cleansing:   Do not scrub or use excessive force. Cleanse wound prior to applying a clean dressing with:    [] Normal Saline   [] Keep Wound Dry in Shower      [] Wound Cleanser ()  [] May Shower at Discharge   [x] cleanse with [place] Citlalli and [place] Citlalli baby shampoo lather leave 2-3 then rinse with water, pat dry and redress wound. Dressings:           Wound Location right medial leg   Apply Primary Dressing: Betamethasone ointment, gauze unna's boot           Change dressing     [] Daily      [] Every Other Day   [] Three times per week  [x] Once a week   [] Do Not Change Dressing     [] Other:    Dietary:  [x] Diet as tolerated: [] Diabetic Diet:   [x] Increase Protein: examples ( Meat, cheese, eggs, greek yogurt, premier protein drink, fish, nuts )   [] Other:    Return Appointment:    [x] Return Appointment: With Dr. Donna Benavides 1week       Electronically signed on 11/4/22    [address] [place] [address] Information: Should you experience any significant changes in your wound(s) or have questions about your wound care, please contact the [facility] at [address] [place] [address] 8:00 am - 4:30. If you need help with your wound outside these hours and cannot wait until we are again available, contact your PCP or go to the hospital emergency room. PLEASE NOTE: IF YOU ARE UNABLE TO OBTAIN WOUND SUPPLIES, CONTINUE TO USE THE SUPPLIES YOU HAVE AVAILABLE UNTIL YOU ARE ABLE TO REACH US. IT IS MOST IMPORTANT TO KEEP THE WOUND COVERED AT ALL TIMES.      Physician Signature:_______________________  Dr. Lalita Urban

## 2022-11-04 NOTE — WOUND CARE
11/04/22 0816   Right Leg Edema Point of Measurement   Leg circumference 36.4 cm   Ankle circumference 22 cm   Compression Therapy Tubular elastic support bandage   RLE Peripheral Vascular    Capillary Refill Less than/equal to 3 seconds   Color Appropriate for race   Temperature Warm   Pedal Pulse Palpable   Wound Ankle Right;Medial #1   Date First Assessed/Time First Assessed: 10/14/22 1014   Present on Hospital Admission: Yes  Location: Ankle  Wound Location Orientation: Right;Medial  Wound Description: #1   Wound Image    Wound Etiology Venous   Dressing Status Old drainage noted   Cleansed Cleansed with saline   Wound Length (cm) 0.1 cm   Wound Width (cm) 0.3 cm   Wound Depth (cm) 0.1 cm   Wound Surface Area (cm^2) 0.03 cm^2   Change in Wound Size % 99.64   Wound Volume (cm^3) 0.003 cm^3   Wound Healing % 100   Wound Assessment Slough   Drainage Amount Scant   Drainage Description Serous   Wound Odor None   Prema-Wound/Incision Assessment Blanchable erythema; Hemosiderin staining (brown yellow)   Edges Flat/open edges   Wound Thickness Description Partial thickness     Visit Vitals  /77 (BP 1 Location: Left upper arm)   Pulse 76   Temp 98.1 °F (36.7 °C)   Resp 15

## 2022-11-11 ENCOUNTER — HOSPITAL ENCOUNTER (OUTPATIENT)
Dept: WOUND CARE | Age: 52
Discharge: HOME | End: 2022-11-11
Payer: COMMERCIAL

## 2022-11-11 VITALS
HEART RATE: 76 BPM | SYSTOLIC BLOOD PRESSURE: 112 MMHG | RESPIRATION RATE: 18 BRPM | TEMPERATURE: 98.1 F | DIASTOLIC BLOOD PRESSURE: 73 MMHG

## 2022-11-11 DIAGNOSIS — I87.311 IDIOPATHIC CHRONIC VENOUS HYPERTENSION OF RIGHT LOWER EXTREMITY WITH ULCER (HCC): Primary | ICD-10-CM

## 2022-11-11 DIAGNOSIS — L97.312 NON-PRESSURE CHRONIC ULCER OF RIGHT ANKLE WITH FAT LAYER EXPOSED (HCC): ICD-10-CM

## 2022-11-11 DIAGNOSIS — L97.919 IDIOPATHIC CHRONIC VENOUS HYPERTENSION OF RIGHT LOWER EXTREMITY WITH ULCER (HCC): Primary | ICD-10-CM

## 2022-11-11 PROCEDURE — 99213 OFFICE O/P EST LOW 20 MIN: CPT | Performed by: PODIATRIST

## 2022-11-11 PROCEDURE — 74011000250 HC RX REV CODE- 250: Performed by: PODIATRIST

## 2022-11-11 RX ORDER — LIDOCAINE HYDROCHLORIDE 40 MG/ML
SOLUTION TOPICAL ONCE
Status: CANCELLED | OUTPATIENT
Start: 2022-11-11 | End: 2022-11-11

## 2022-11-11 RX ORDER — LIDOCAINE HYDROCHLORIDE 20 MG/ML
15 SOLUTION OROPHARYNGEAL ONCE
Status: CANCELLED | OUTPATIENT
Start: 2022-11-11 | End: 2022-11-11

## 2022-11-11 RX ORDER — SILVER SULFADIAZINE 10 G/1000G
CREAM TOPICAL ONCE
Status: CANCELLED | OUTPATIENT
Start: 2022-11-11 | End: 2022-11-11

## 2022-11-11 RX ORDER — BACITRACIN 500 [USP'U]/G
OINTMENT TOPICAL ONCE
Status: CANCELLED | OUTPATIENT
Start: 2022-11-11 | End: 2022-11-11

## 2022-11-11 RX ORDER — LIDOCAINE 50 MG/G
OINTMENT TOPICAL ONCE
Status: CANCELLED | OUTPATIENT
Start: 2022-11-11 | End: 2022-11-11

## 2022-11-11 RX ORDER — LIDOCAINE HYDROCHLORIDE 20 MG/ML
JELLY TOPICAL ONCE
Status: CANCELLED | OUTPATIENT
Start: 2022-11-11 | End: 2022-11-11

## 2022-11-11 RX ORDER — BACITRACIN ZINC AND POLYMYXIN B SULFATE 500; 1000 [USP'U]/G; [USP'U]/G
OINTMENT TOPICAL ONCE
Status: CANCELLED | OUTPATIENT
Start: 2022-11-11 | End: 2022-11-11

## 2022-11-11 RX ORDER — LIDOCAINE 40 MG/G
CREAM TOPICAL ONCE
Status: CANCELLED | OUTPATIENT
Start: 2022-11-11 | End: 2022-11-11

## 2022-11-11 RX ORDER — CLOBETASOL PROPIONATE 0.5 MG/G
OINTMENT TOPICAL ONCE
Status: CANCELLED | OUTPATIENT
Start: 2022-11-11 | End: 2022-11-11

## 2022-11-11 RX ORDER — MUPIROCIN 20 MG/G
OINTMENT TOPICAL ONCE
Status: CANCELLED | OUTPATIENT
Start: 2022-11-11 | End: 2022-11-11

## 2022-11-11 RX ORDER — BETAMETHASONE DIPROPIONATE 0.5 MG/G
OINTMENT TOPICAL ONCE
Status: CANCELLED | OUTPATIENT
Start: 2022-11-11 | End: 2022-11-11

## 2022-11-11 RX ORDER — TRIAMCINOLONE ACETONIDE 1 MG/G
OINTMENT TOPICAL ONCE
Status: CANCELLED | OUTPATIENT
Start: 2022-11-11 | End: 2022-11-11

## 2022-11-11 RX ORDER — GENTAMICIN SULFATE 1 MG/G
OINTMENT TOPICAL ONCE
Status: CANCELLED | OUTPATIENT
Start: 2022-11-11 | End: 2022-11-11

## 2022-11-11 RX ADMIN — Medication: at 08:23

## 2022-11-11 NOTE — WOUND CARE
[address]   Progress Note and Procedure Note   NO Procedure      Evgeny Helms  MEDICAL RECORD NUMBER:  [id number]  AGE: 47 y.o. RACE WHITE/NON-  GENDER: male  : 1970  EPISODE DATE:  2022    Subjective:     Chief Complaint   Patient presents with    Wound Check     Right lower leg wound         HISTORY of PRESENT ILLNESS HPI    Evgeny Helms is a 47 y.o. male who presents today for wound/ulcer evaluation. History of Wound Context: h/o b/l medial ankle venous stasis type ulcers, had stripping done on LLE which solved issue there for past 7 years, but having issues now on RT LE. Wound/Ulcer Pain Timing/Severity: none  Quality of pain: n/a  Severity:  0 / 10   Modifying Factors: None  Associated Signs/Symptoms: none    Ulcer Identification:  Ulcer Type: venous    Contributing Factors: edema    Wound: right medial ankle         PAST MEDICAL HISTORY    Past Medical History:   Diagnosis Date    Hypertension     Testicular cancer ([address]     Venous (peripheral) insufficiency         PAST SURGICAL HISTORY    Past Surgical History:   Procedure Laterality Date    HX APPENDECTOMY      HX HERNIA REPAIR      HX UROLOGICAL  2000    testicular cancer surgery    VASCULAR SURGERY PROCEDURE UNLIST Left        FAMILY HISTORY    Family History   Problem Relation Age of Onset    Hypertension Mother     Hypertension Father     Diabetes Brother        SOCIAL HISTORY    Social History     Tobacco Use    Smoking status: Former    Smokeless tobacco: Never   Vaping Use    Vaping Use: Never used   Substance Use Topics    Alcohol use: Yes    Drug use: Never       ALLERGIES    No Known Allergies    MEDICATIONS    Current Outpatient Medications on File Prior to Encounter   Medication Sig Dispense Refill    chlorthalidone (HYGROTON) 25 mg tablet Take 1 Tablet by mouth daily. Indications: high blood pressure 90 Tablet 1    simvastatin (ZOCOR) 20 mg tablet Take 1 Tablet by mouth daily.  80 Tablet 1    olmesartan (BENICAR) 40 mg tablet Take 1 Tablet by mouth daily. 90 Tablet 1    carvediloL (COREG) 12.5 mg tablet Take 1 Tablet by mouth two (2) times a day. 180 Tablet 1    testosterone (ANDROGEL) 20.25 mg/1.25 gram (1.62 %) gel Apply 61 mg to affected area daily. Max Daily Amount: 60.75 mg. 1 Each 3     No current facility-administered medications on file prior to encounter. REVIEW OF SYSTEMS    Consitutional: no weight loss, night sweats, fatigue / malaise / lethargy. Musculoskeletal: no joint / extremity pain, misalignment, stiffness, decreased ROM, crepitus. Integument: No pruritis, rashes, lesions, right ankle wound. Psychiatric: No depression, anxiety, paranoia    Objective:     Visit Vitals  /73 (BP 1 Location: Left upper arm, BP Patient Position: Sitting)   Pulse 76   Temp 98.1 °F (36.7 °C)   Resp 18       Wt Readings from Last 3 Encounters:   10/14/22 104.3 kg (230 lb)   10/10/22 104.5 kg (230 lb 6.4 oz)   08/30/22 101.2 kg (223 lb 3.2 oz)       PHYSICAL EXAM    Vascular:  B/L LE  DP 1/4; PT 1/4  capillary fill time brisk, mild spongy edema is present, skin temperature is cool, varicosities are present. Dermatological:     Wound: 1  Location: right ankle  Measurements: per RN note  Margins: hemosiderosis, venous skin changes  Drainage: scant serous  Odor: none  Wound base: fibrotic   Lymphangitic streaking? No.  Undermining? No.  Sinus tracts? No.  Exposed bone? No.  Subcutaneous crepitation on palpation? No.     Nails are WNL. Skin is dry. There is no maceration of the interspaces of the feet b/l. Neurological:  DTR are present, protective sensation per 5.07 [place] Chrissie monofilament is intact, patient is AAOx3, mood is normal. Epicritic sensation is intact. Orthopedic:  B/L LE are symmetric, ROM of ankle, STJ, 1st MTPJ is limited, MMT 5 out of 5 for B/L LE. No pedal amputations. Constitutional: Pt is a well developed, middle-aged male. Lymphatics: negative tenderness to palpation of neck/axillary/inguinal nodes. Assessment:      Problem List Items Addressed This Visit          Circulatory    Idiopathic chronic venous hypertension of right lower extremity with ulcer ([address] - Primary    Relevant Medications    lidocaine (ALOCANE) 4 % topical gel (Completed) (Start on 11/11/2022  9:00 AM)    Other Relevant Orders    INITIATE OUTPATIENT WOUND CARE PROTOCOL       Other    Non-pressure chronic ulcer of right ankle with fat layer exposed ([address]    Relevant Medications    lidocaine (ALOCANE) 4 % topical gel (Completed) (Start on 11/11/2022  9:00 AM)    Other Relevant Orders    INITIATE OUTPATIENT WOUND CARE PROTOCOL       Procedure Note  Indications:  Based on my examination of this patient's wound(s)/ulcer(s) today, debridement is not required to promote healing and evaluate the wound base. Wound Ankle Right;Medial #1 (Active)   Wound Image   11/11/22 0814   Wound Etiology Venous 11/04/22 0816   Dressing Status Old drainage noted 11/04/22 0816   Cleansed Soap and water 11/11/22 0814   Dressing/Treatment Gauze dressing/dressing sponge; Other (Comment) 11/04/22 0849   Wound Length (cm) 0.1 cm 11/11/22 0814   Wound Width (cm) 0.2 cm 11/11/22 0814   Wound Depth (cm) 0.1 cm 11/11/22 0814   Wound Surface Area (cm^2) 0.02 cm^2 11/11/22 0814   Change in Wound Size % 99.76 11/11/22 0814   Wound Volume (cm^3) 0.002 cm^3 11/11/22 0814   Wound Healing % 100 11/11/22 0814   Post-Procedure Length (cm) 0.1 cm 11/04/22 0841   Post-Procedure Width (cm) 0.3 cm 11/04/22 0841   Post-Procedure Depth (cm) 0.1 cm 11/04/22 0841   Post-Procedure Surface Area (cm^2) 0.03 cm^2 11/04/22 0841   Post-Procedure Volume (cm^3) 0.003 cm^3 11/04/22 0841   Wound Assessment [facility] 11/11/22 0814   Drainage Amount Scant 11/11/22 0814   Drainage Description Serosanguinous 11/11/22 0814   Wound Odor None 11/11/22 0814   Prema-Wound/Incision Assessment Blanchable erythema 11/11/22 0814 Edges Flat/open edges 11/11/22 0814   Wound Thickness Description Partial thickness 11/04/22 0816   Number of days: 28        Plan:     L97.312 - RT ankle ulcer fat     I87.311 - Venous HTN with ulcer / inflammation RT LE     - Pt evaluated and tx.  - Wound improved and less painful.  - No s/s acute infection, reviewed with pt. - Will do betamethasone and Unna boot. - Pt scheduled for vein oblation with Dr. Toniann Merlin on 11/16/22.  - F/U at [address] [place] [address],[address] in one week or sooner prn further issues. Treatment Note please see attached Discharge Instructions    Written patient dismissal instructions given to patient or POA.          Electronically signed by Armand Dickerson DPM on 11/11/2022 at 8:55 AM

## 2022-11-11 NOTE — DISCHARGE INSTRUCTIONS
Discharge Instructions for  [facility]  [address] [facility], [address] [facility]  Telephone: [phone]     FAX [phone]    NAME:  Kim Clark  YOB: 1970  DATE:11/11/22      Wound Cleansing:   Do not scrub or use excessive force. Cleanse wound prior to applying a clean dressing with:    [] Normal Saline   [] Keep Wound Dry in Shower      [] Wound Cleanser ()  [] May Shower at Discharge   [x] cleanse with [place] Citlalli and Faustino Citlalli baby shampoo lather leave 2-3 then rinse with water, pat dry and redress wound. Dressings:           Wound Location right medial leg   Apply Primary Dressing: Betamethasone ointment, gauze unna's boot           Change dressing     [] Daily      [] Every Other Day   [] Three times per week  [x] Once a week   [] Do Not Change Dressing     [] Other:    Dietary:  [x] Diet as tolerated: [] Diabetic Diet:   [x] Increase Protein: examples ( Meat, cheese, eggs, greek yogurt, premier protein drink, fish, nuts )   [] Other:    Return Appointment:    [x] Return Appointment: With Dr. Patricio Mcdermott 1week       Electronically signed on 11/11/22    [address] [facility] Information: Should you experience any significant changes in your wound(s) or have questions about your wound care, please contact [facility] [address] at [address] [place] [address] 8:00 am - 4:30. If you need help with your wound outside these hours and cannot wait until we are again available, contact your PCP or go to the hospital emergency room. PLEASE NOTE: IF YOU ARE UNABLE TO OBTAIN WOUND SUPPLIES, CONTINUE TO USE THE SUPPLIES YOU HAVE AVAILABLE UNTIL YOU ARE ABLE TO REACH US. IT IS MOST IMPORTANT TO KEEP THE WOUND COVERED AT ALL TIMES.      Physician Signature:_______________________  Dr. Lalita Urban

## 2022-11-11 NOTE — WOUND CARE
11/11/22 0814   Anesthetic   Anesthetic 4% Lidocaine Cream   Right Leg Edema Point of Measurement   Leg circumference 35.8 cm   Ankle circumference 21 cm   RLE Peripheral Vascular    Capillary Refill Less than/equal to 3 seconds   Color Appropriate for race   Temperature Warm   Pedal Pulse Palpable   Wound Ankle Right;Medial #1   Date First Assessed/Time First Assessed: 10/14/22 1014   Present on Hospital Admission: Yes  Location: Ankle  Wound Location Orientation: Right;Medial  Wound Description: #1   Wound Image    Cleansed Soap and water   Wound Length (cm) 0.1 cm   Wound Width (cm) 0.2 cm   Wound Depth (cm) 0.1 cm   Wound Surface Area (cm^2) 0.02 cm^2   Change in Wound Size % 99.76   Wound Volume (cm^3) 0.002 cm^3   Wound Healing % 100   Wound Assessment Slough   Drainage Amount Scant   Drainage Description Serosanguinous   Wound Odor None   Prema-Wound/Incision Assessment Blanchable erythema   Edges Flat/open edges   Pain 1   Pain Scale 1 Numeric (0 - 10)   Pain Intensity 1 0   Visit Vitals  /73 (BP 1 Location: Left upper arm, BP Patient Position: Sitting)   Pulse 76   Temp 98.1 °F (36.7 °C)   Resp 18

## 2022-11-11 NOTE — WOUND CARE
11/11/22 0855   Right Leg Edema Point of Measurement   Compression Therapy Unna boot   Wound Ankle Right;Medial #1   Date First Assessed/Time First Assessed: 10/14/22 1014   Present on Hospital Admission: Yes  Location: Ankle  Wound Location Orientation: Right;Medial  Wound Description: #1   Dressing/Treatment   (betamethasone gauze unna boot)   Discharge Condition: Stable     Pain: 0    Ambulatory Status: Walking    Discharge Destination: Home     Transportation: Car    Accompanied by: Self     Discharge instructions reviewed with Patient and copy or written instructions have been provided. All questions/concerns have been addressed at this time.

## 2022-11-18 ENCOUNTER — HOSPITAL ENCOUNTER (OUTPATIENT)
Dept: WOUND CARE | Age: 52
Discharge: HOME | End: 2022-11-18
Payer: COMMERCIAL

## 2022-11-18 VITALS
RESPIRATION RATE: 15 BRPM | HEART RATE: 73 BPM | DIASTOLIC BLOOD PRESSURE: 76 MMHG | TEMPERATURE: 98.2 F | SYSTOLIC BLOOD PRESSURE: 126 MMHG

## 2022-11-18 DIAGNOSIS — I87.311 IDIOPATHIC CHRONIC VENOUS HYPERTENSION OF RIGHT LOWER EXTREMITY WITH ULCER (HCC): Primary | ICD-10-CM

## 2022-11-18 DIAGNOSIS — L97.312 NON-PRESSURE CHRONIC ULCER OF RIGHT ANKLE WITH FAT LAYER EXPOSED (HCC): ICD-10-CM

## 2022-11-18 DIAGNOSIS — L97.919 IDIOPATHIC CHRONIC VENOUS HYPERTENSION OF RIGHT LOWER EXTREMITY WITH ULCER (HCC): Primary | ICD-10-CM

## 2022-11-18 PROCEDURE — 99212 OFFICE O/P EST SF 10 MIN: CPT | Performed by: PODIATRIST

## 2022-11-18 PROCEDURE — 74011000250 HC RX REV CODE- 250: Performed by: PODIATRIST

## 2022-11-18 RX ORDER — LIDOCAINE HYDROCHLORIDE 40 MG/ML
SOLUTION TOPICAL ONCE
Status: CANCELLED | OUTPATIENT
Start: 2022-11-18 | End: 2022-11-18

## 2022-11-18 RX ORDER — BACITRACIN ZINC AND POLYMYXIN B SULFATE 500; 1000 [USP'U]/G; [USP'U]/G
OINTMENT TOPICAL ONCE
Status: CANCELLED | OUTPATIENT
Start: 2022-11-18 | End: 2022-11-18

## 2022-11-18 RX ORDER — LIDOCAINE 40 MG/G
CREAM TOPICAL ONCE
Status: CANCELLED | OUTPATIENT
Start: 2022-11-18 | End: 2022-11-18

## 2022-11-18 RX ORDER — MUPIROCIN 20 MG/G
OINTMENT TOPICAL ONCE
Status: CANCELLED | OUTPATIENT
Start: 2022-11-18 | End: 2022-11-18

## 2022-11-18 RX ORDER — LIDOCAINE HYDROCHLORIDE 20 MG/ML
JELLY TOPICAL ONCE
Status: CANCELLED | OUTPATIENT
Start: 2022-11-18 | End: 2022-11-18

## 2022-11-18 RX ORDER — LIDOCAINE 50 MG/G
OINTMENT TOPICAL ONCE
Status: CANCELLED | OUTPATIENT
Start: 2022-11-18 | End: 2022-11-18

## 2022-11-18 RX ORDER — SILVER SULFADIAZINE 10 G/1000G
CREAM TOPICAL ONCE
Status: CANCELLED | OUTPATIENT
Start: 2022-11-18 | End: 2022-11-18

## 2022-11-18 RX ORDER — BACITRACIN 500 [USP'U]/G
OINTMENT TOPICAL ONCE
Status: CANCELLED | OUTPATIENT
Start: 2022-11-18 | End: 2022-11-18

## 2022-11-18 RX ORDER — LIDOCAINE HYDROCHLORIDE 20 MG/ML
15 SOLUTION OROPHARYNGEAL ONCE
Status: CANCELLED | OUTPATIENT
Start: 2022-11-18 | End: 2022-11-18

## 2022-11-18 RX ORDER — BETAMETHASONE DIPROPIONATE 0.5 MG/G
OINTMENT TOPICAL ONCE
Status: CANCELLED | OUTPATIENT
Start: 2022-11-18 | End: 2022-11-18

## 2022-11-18 RX ORDER — CLOBETASOL PROPIONATE 0.5 MG/G
OINTMENT TOPICAL ONCE
Status: CANCELLED | OUTPATIENT
Start: 2022-11-18 | End: 2022-11-18

## 2022-11-18 RX ORDER — GENTAMICIN SULFATE 1 MG/G
OINTMENT TOPICAL ONCE
Status: CANCELLED | OUTPATIENT
Start: 2022-11-18 | End: 2022-11-18

## 2022-11-18 RX ORDER — TRIAMCINOLONE ACETONIDE 1 MG/G
OINTMENT TOPICAL ONCE
Status: CANCELLED | OUTPATIENT
Start: 2022-11-18 | End: 2022-11-18

## 2022-11-18 RX ADMIN — Medication: at 08:24

## 2022-11-18 NOTE — DISCHARGE INSTRUCTIONS
Discharge Instructions for  [facility]  [address] [facility] [place], [address] [address]  Telephone: [phone]     FAX [phone]    NAME:  Scar Connor  YOB: 1970  DATE:11/18/22      Wound Cleansing:   Do not scrub or use excessive force. Cleanse wound prior to applying a clean dressing with:    [] Normal Saline   [] Keep Wound Dry in Shower      [] Wound Cleanser ()  [] May Shower at Discharge   [x] cleanse with Starlene Houston and Starlene Houston baby shampoo lather leave 2-3 then rinse with water, pat dry and redress wound. Dressings:           Wound Location right medial leg   Apply Primary Dressing: betadine gauze  tape           Change dressing     [x] Daily        [] Other:    Dietary:  [x] Diet as tolerated: [] Diabetic Diet:   [x] Increase Protein: examples ( Meat, cheese, eggs, greek yogurt, premier protein drink, fish, nuts )   [] Other:  [facility] [address]      Your treatment has been completed at [facility] outpatient wound clinic on 11/18/2022  , per Dr. Mary Obando     We know patients have several options when choosing a health care provider. We would like to express our sincere appreciation for having had the chance to be yours. More importantly, we enjoy having you as part of our family. We also hope your experience with us has surpassed your expectations and that you have been pleased with our service. We appreciate the confidence you've shown in selecting us as your health care provider and we will continue or commitment to provide the highest quality of service. Again, thank you for choosing us for your health care needs. If you have any further questions or concerns, please call [phone]. It has been our pleasure serving you and we hope to continue our relationship in the future, if the need occurs.        Sincerely,    [address] [place] [address] Team

## 2022-11-18 NOTE — WOUND CARE
[address]   Progress Note and Procedure Note   NO Procedure      Bridger Warner  MEDICAL RECORD NUMBER:  [id number]  AGE: 47 y.o. RACE WHITE/NON-  GENDER: male  : 1970  EPISODE DATE:  2022    Subjective:     Chief Complaint   Patient presents with    Follow-up     Right medial ankle         HISTORY of PRESENT ILLNESS HPI    Bridger Warner is a 47 y.o. male who presents today for wound/ulcer evaluation. History of Wound Context: h/o b/l medial ankle venous stasis type ulcers, had stripping done on LLE which solved issue there for past 7 years, but having issues now on RT LE. Wound/Ulcer Pain Timing/Severity: none  Quality of pain: n/a  Severity:  0 / 10   Modifying Factors: None  Associated Signs/Symptoms: none    Ulcer Identification:  Ulcer Type: venous    Contributing Factors: edema    Wound: right medial ankle         PAST MEDICAL HISTORY    Past Medical History:   Diagnosis Date    Hypertension     Testicular cancer ([address]     Venous (peripheral) insufficiency         PAST SURGICAL HISTORY    Past Surgical History:   Procedure Laterality Date    HX APPENDECTOMY      HX HERNIA REPAIR      HX UROLOGICAL  2000    testicular cancer surgery    VASCULAR SURGERY PROCEDURE UNLIST Left        FAMILY HISTORY    Family History   Problem Relation Age of Onset    Hypertension Mother     Hypertension Father     Diabetes Brother        SOCIAL HISTORY    Social History     Tobacco Use    Smoking status: Former    Smokeless tobacco: Never   Vaping Use    Vaping Use: Never used   Substance Use Topics    Alcohol use: Yes    Drug use: Never       ALLERGIES    No Known Allergies    MEDICATIONS    Current Outpatient Medications on File Prior to Encounter   Medication Sig Dispense Refill    testosterone (ANDROGEL) 20.25 mg/1.25 gram (1.62 %) gel Apply 61 mg to affected area daily.  Max Daily Amount: 60.75 mg. 1 Each 3    chlorthalidone (HYGROTON) 25 mg tablet Take 1 Tablet by mouth daily. Indications: high blood pressure 90 Tablet 1    simvastatin (ZOCOR) 20 mg tablet Take 1 Tablet by mouth daily. 90 Tablet 1    olmesartan (BENICAR) 40 mg tablet Take 1 Tablet by mouth daily. 90 Tablet 1    carvediloL (COREG) 12.5 mg tablet Take 1 Tablet by mouth two (2) times a day. 180 Tablet 1     No current facility-administered medications on file prior to encounter. REVIEW OF SYSTEMS    Consitutional: no weight loss, night sweats, fatigue / malaise / lethargy. Musculoskeletal: no joint / extremity pain, misalignment, stiffness, decreased ROM, crepitus. Integument: No pruritis, rashes, lesions, right ankle wound. Psychiatric: No depression, anxiety, paranoia    Objective:     Visit Vitals  /76 (BP 1 Location: Right upper arm, BP Patient Position: Sitting)   Pulse 73   Temp 98.2 °F (36.8 °C)   Resp 15       Wt Readings from Last 3 Encounters:   10/14/22 104.3 kg (230 lb)   10/10/22 104.5 kg (230 lb 6.4 oz)   08/30/22 101.2 kg (223 lb 3.2 oz)       PHYSICAL EXAM    Vascular:  B/L LE  DP 1/4; PT 1/4  capillary fill time brisk, mild spongy edema is present, skin temperature is cool, varicosities are present. Dermatological:     Wound: 1  Location: right ankle  Measurements: per RN note  Margins: hemosiderosis, venous skin changes  Drainage: none  Odor: none  Wound base: scab  Lymphangitic streaking? No.  Undermining? No.  Sinus tracts? No.  Exposed bone? No.  Subcutaneous crepitation on palpation? No.     Nails are WNL. Skin is dry. There is no maceration of the interspaces of the feet b/l. Neurological:  DTR are present, protective sensation per 5.07 [place] Chrissie monofilament is intact, patient is AAOx3, mood is normal. Epicritic sensation is intact. Orthopedic:  B/L LE are symmetric, ROM of ankle, STJ, 1st MTPJ is limited, MMT 5 out of 5 for B/L LE. No pedal amputations. Constitutional: Pt is a well developed, middle-aged male.      Lymphatics: negative tenderness to palpation of neck/axillary/inguinal nodes. Assessment:      Problem List Items Addressed This Visit          Circulatory    Idiopathic chronic venous hypertension of right lower extremity with ulcer ([address] - Primary    Relevant Medications    lidocaine (ALOCANE) 4 % topical gel (Completed)    Other Relevant Orders    INITIATE OUTPATIENT WOUND CARE PROTOCOL       Other    Non-pressure chronic ulcer of right ankle with fat layer exposed ([address]    Relevant Medications    lidocaine (ALOCANE) 4 % topical gel (Completed)    Other Relevant Orders    INITIATE OUTPATIENT WOUND CARE PROTOCOL       Procedure Note  Indications:  Based on my examination of this patient's wound(s)/ulcer(s) today, debridement is not required to promote healing and evaluate the wound base.     Wound Ankle Right;Medial #1 (Active)   Wound Image   11/18/22 0816   Wound Etiology Venous 11/18/22 0816   Dressing Status Old drainage noted 11/18/22 0816   Cleansed Cleansed with saline 11/18/22 0816   Dressing/Treatment Betadine swabs/Povidone Iodine;Gauze dressing/dressing sponge;Tape/Soft cloth adhesive tape 11/18/22 0843   Wound Length (cm) 0.2 cm 11/18/22 0816   Wound Width (cm) 0.4 cm 11/18/22 0816   Wound Depth (cm) 0.1 cm 11/18/22 0816   Wound Surface Area (cm^2) 0.08 cm^2 11/18/22 0816   Change in Wound Size % 99.04 11/18/22 0816   Wound Volume (cm^3) 0.008 cm^3 11/18/22 0816   Wound Healing % 99 11/18/22 0816   Post-Procedure Length (cm) 0.1 cm 11/04/22 0841   Post-Procedure Width (cm) 0.3 cm 11/04/22 0841   Post-Procedure Depth (cm) 0.1 cm 11/04/22 0841   Post-Procedure Surface Area (cm^2) 0.03 cm^2 11/04/22 0841   Post-Procedure Volume (cm^3) 0.003 cm^3 11/04/22 0841   Wound Assessment [facility] 11/18/22 0816   Drainage Amount Small 11/18/22 0816   Drainage Description Serosanguinous 11/18/22 0816   Wound Odor None 11/11/22 0814   Prema-Wound/Incision Assessment Blanchable erythema 11/18/22 0816   Edges Flat/open edges 11/18/22 0816 Wound Thickness Description Partial thickness 11/04/22 0816   Number of days: 35        Plan:     L97.312 - RT ankle ulcer fat     I87.311 - Venous HTN with ulcer / inflammation RT LE     - Pt evaluated and tx.  - Wound improved almost healed  - No s/s acute infection, reviewed with pt. - Will do betamethasone and tubigrips. He has compression stockings at home. - Vein oblation with Dr. Sandy Sommers on 11/16/22. - Will discharge from clinic. Follow up with wound starts to reopen. Treatment Note please see attached Discharge Instructions    Written patient dismissal instructions given to patient or POA.          Electronically signed by Colletta Em, DPM on 11/18/2022 at 9:04 AM

## 2022-11-18 NOTE — WOUND CARE
11/18/22 0843   Wound Ankle Right;Medial #1   Date First Assessed/Time First Assessed: 10/14/22 1014   Present on Hospital Admission: Yes  Location: Ankle  Wound Location Orientation: Right;Medial  Wound Description: #1   Dressing/Treatment Betadine swabs/Povidone Iodine;Gauze dressing/dressing sponge;Tape/Soft cloth adhesive tape   Discharge Condition: Stable     Pain: 0    Ambulatory Status: Walking    Discharge Destination: Home     Transportation: Car    Accompanied by: Self     Discharge instructions reviewed with Patient and copy or written instructions have been provided. All questions/concerns have been addressed at this time. AVS given.

## 2022-11-18 NOTE — WOUND CARE
11/18/22 0816   Right Leg Edema Point of Measurement   Leg circumference 36 cm   Ankle circumference 22.5 cm   Compression Therapy Unna boot   RLE Peripheral Vascular    Capillary Refill Less than/equal to 3 seconds   Color Appropriate for race   Temperature Warm   Pedal Pulse Palpable   Wound Ankle Right;Medial #1   Date First Assessed/Time First Assessed: 10/14/22 1014   Present on Hospital Admission: Yes  Location: Ankle  Wound Location Orientation: Right;Medial  Wound Description: #1   Wound Image    Wound Etiology Venous   Dressing Status Old drainage noted   Cleansed Cleansed with saline   Wound Length (cm) 0.2 cm   Wound Width (cm) 0.4 cm   Wound Depth (cm) 0.1 cm   Wound Surface Area (cm^2) 0.08 cm^2   Change in Wound Size % 99.04   Wound Volume (cm^3) 0.008 cm^3   Wound Healing % 99   Wound Assessment Slough   Drainage Amount Small   Drainage Description Serosanguinous   Prema-Wound/Incision Assessment Blanchable erythema   Edges Flat/open edges   Visit Vitals  /76 (BP 1 Location: Right upper arm, BP Patient Position: Sitting)   Pulse 73   Temp 98.2 °F (36.8 °C)   Resp 15

## 2022-12-16 DIAGNOSIS — I10 HYPERTENSION, UNSPECIFIED TYPE: ICD-10-CM

## 2022-12-16 RX ORDER — OLMESARTAN MEDOXOMIL 40 MG/1
TABLET ORAL
Qty: 90 TABLET | Refills: 1 | Status: SHIPPED | OUTPATIENT
Start: 2022-12-16

## 2023-01-13 DIAGNOSIS — E29.1 HYPOGONADISM IN MALE: ICD-10-CM

## 2023-01-13 RX ORDER — TESTOSTERONE 20.25 MG/1.25G
GEL TOPICAL
Qty: 1 EACH | Refills: 3 | Status: SHIPPED | OUTPATIENT
Start: 2023-01-13

## 2023-02-22 DIAGNOSIS — E78.5 HYPERLIPIDEMIA, UNSPECIFIED HYPERLIPIDEMIA TYPE: ICD-10-CM

## 2023-02-22 RX ORDER — SIMVASTATIN 20 MG/1
TABLET, FILM COATED ORAL
Qty: 90 TABLET | Refills: 1 | Status: SHIPPED | OUTPATIENT
Start: 2023-02-22

## 2023-06-09 DIAGNOSIS — E29.1 TESTICULAR HYPOFUNCTION: ICD-10-CM

## 2023-06-12 RX ORDER — TESTOSTERONE 16.2 MG/G
GEL TRANSDERMAL
Qty: 75 G | OUTPATIENT
Start: 2023-06-12

## 2023-06-17 DIAGNOSIS — I10 ESSENTIAL (PRIMARY) HYPERTENSION: ICD-10-CM

## 2023-06-19 RX ORDER — OLMESARTAN MEDOXOMIL 40 MG/1
TABLET ORAL
Qty: 90 TABLET | Refills: 1 | Status: SHIPPED | OUTPATIENT
Start: 2023-06-19

## 2023-06-25 DIAGNOSIS — E29.1 TESTICULAR HYPOFUNCTION: Primary | ICD-10-CM

## 2023-06-25 DIAGNOSIS — E29.1 HYPOGONADISM IN MALE: ICD-10-CM

## 2023-06-26 RX ORDER — TESTOSTERONE 16.2 MG/G
GEL TRANSDERMAL
Qty: 75 G | Refills: 3 | Status: SHIPPED | OUTPATIENT
Start: 2023-06-26 | End: 2023-09-24

## 2023-09-04 DIAGNOSIS — I10 ESSENTIAL (PRIMARY) HYPERTENSION: ICD-10-CM

## 2023-09-05 ENCOUNTER — TELEPHONE (OUTPATIENT)
Age: 53
End: 2023-09-05

## 2023-09-05 DIAGNOSIS — E29.1 HYPOGONADISM IN MALE: ICD-10-CM

## 2023-09-05 RX ORDER — TESTOSTERONE 16.2 MG/G
GEL TRANSDERMAL
Qty: 75 G | Refills: 1 | Status: SHIPPED | OUTPATIENT
Start: 2023-09-05 | End: 2023-11-05

## 2023-09-05 RX ORDER — CARVEDILOL 12.5 MG/1
TABLET ORAL
Qty: 180 TABLET | Refills: 1 | Status: SHIPPED | OUTPATIENT
Start: 2023-09-05

## 2023-09-05 NOTE — TELEPHONE ENCOUNTER
----- Message from [address] sent at 9/5/2023 12:27 PM EDT -----  Subject: Refill Request    QUESTIONS  Name of Medication? Testosterone (ANDROGEL) 20.25 MG/ACT (1.62%) GEL gel  Patient-reported dosage and instructions? once a day  How many days do you have left? 2  Preferred Pharmacy? [id number]  Pharmacy phone number (if available)? [phone]  Additional Information for Provider? needs prior auth?  ---------------------------------------------------------------------------  --------------  CALL BACK INFO  What is the best way for the office to contact you? OK to leave message on   voicemail  Preferred Call Back Phone Number? [phone]  ---------------------------------------------------------------------------  --------------  SCRIPT ANSWERS  Relationship to Patient?  Self

## 2023-09-20 DIAGNOSIS — I10 ESSENTIAL (PRIMARY) HYPERTENSION: ICD-10-CM

## 2023-09-20 RX ORDER — CHLORTHALIDONE 25 MG/1
25 TABLET ORAL EVERY MORNING
Qty: 90 TABLET | Refills: 1 | Status: SHIPPED | OUTPATIENT
Start: 2023-09-20

## 2023-10-28 SDOH — ECONOMIC STABILITY: INCOME INSECURITY: HOW HARD IS IT FOR YOU TO PAY FOR THE VERY BASICS LIKE FOOD, HOUSING, MEDICAL CARE, AND HEATING?: NOT HARD AT ALL

## 2023-10-28 SDOH — ECONOMIC STABILITY: FOOD INSECURITY: WITHIN THE PAST 12 MONTHS, YOU WORRIED THAT YOUR FOOD WOULD RUN OUT BEFORE YOU GOT MONEY TO BUY MORE.: NEVER TRUE

## 2023-10-28 SDOH — ECONOMIC STABILITY: TRANSPORTATION INSECURITY
IN THE PAST 12 MONTHS, HAS LACK OF TRANSPORTATION KEPT YOU FROM MEETINGS, WORK, OR FROM GETTING THINGS NEEDED FOR DAILY LIVING?: NO

## 2023-10-28 SDOH — ECONOMIC STABILITY: FOOD INSECURITY: WITHIN THE PAST 12 MONTHS, THE FOOD YOU BOUGHT JUST DIDN'T LAST AND YOU DIDN'T HAVE MONEY TO GET MORE.: NEVER TRUE

## 2023-10-28 SDOH — ECONOMIC STABILITY: HOUSING INSECURITY
IN THE LAST 12 MONTHS, WAS THERE A TIME WHEN YOU DID NOT HAVE A STEADY PLACE TO SLEEP OR SLEPT IN A SHELTER (INCLUDING NOW)?: NO

## 2023-10-30 ENCOUNTER — OFFICE VISIT (OUTPATIENT)
Age: 53
End: 2023-10-30
Payer: COMMERCIAL

## 2023-10-30 VITALS
BODY MASS INDEX: 29.77 KG/M2 | WEIGHT: 224.6 LBS | OXYGEN SATURATION: 97 % | HEIGHT: 73 IN | TEMPERATURE: 98 F | SYSTOLIC BLOOD PRESSURE: 119 MMHG | HEART RATE: 79 BPM | DIASTOLIC BLOOD PRESSURE: 84 MMHG | RESPIRATION RATE: 17 BRPM

## 2023-10-30 DIAGNOSIS — L30.9 ECZEMA, UNSPECIFIED TYPE: ICD-10-CM

## 2023-10-30 DIAGNOSIS — E29.1 HYPOGONADISM IN MALE: ICD-10-CM

## 2023-10-30 DIAGNOSIS — Z85.47 HISTORY OF TESTICULAR CANCER: ICD-10-CM

## 2023-10-30 DIAGNOSIS — I10 HYPERTENSION, UNSPECIFIED TYPE: Primary | ICD-10-CM

## 2023-10-30 DIAGNOSIS — D64.9 ANEMIA, UNSPECIFIED TYPE: ICD-10-CM

## 2023-10-30 DIAGNOSIS — E29.1 TESTICULAR HYPOFUNCTION: ICD-10-CM

## 2023-10-30 DIAGNOSIS — B35.1 ONYCHOMYCOSIS: ICD-10-CM

## 2023-10-30 DIAGNOSIS — R16.1 SPLENOMEGALY, NOT ELSEWHERE CLASSIFIED: ICD-10-CM

## 2023-10-30 DIAGNOSIS — E78.5 HYPERLIPIDEMIA, UNSPECIFIED HYPERLIPIDEMIA TYPE: ICD-10-CM

## 2023-10-30 LAB
ALBUMIN SERPL-MCNC: 4.4 G/DL (ref 3.5–5)
ALBUMIN/GLOB SERPL: 1.3 (ref 1.1–2.2)
ALP SERPL-CCNC: 58 U/L (ref 45–117)
ALT SERPL-CCNC: 44 U/L (ref 12–78)
ANION GAP SERPL CALC-SCNC: 6 MMOL/L (ref 5–15)
AST SERPL-CCNC: 19 U/L (ref 15–37)
BASOPHILS # BLD: 0.1 K/UL (ref 0–0.1)
BASOPHILS NFR BLD: 1 % (ref 0–1)
BILIRUB SERPL-MCNC: 1.5 MG/DL (ref 0.2–1)
BUN SERPL-MCNC: 23 MG/DL (ref 6–20)
BUN/CREAT SERPL: 16 (ref 12–20)
CALCIUM SERPL-MCNC: 9.7 MG/DL (ref 8.5–10.1)
CHLORIDE SERPL-SCNC: 100 MMOL/L (ref 97–108)
CHOLEST SERPL-MCNC: 190 MG/DL
CO2 SERPL-SCNC: 26 MMOL/L (ref 21–32)
CREAT SERPL-MCNC: 1.42 MG/DL (ref 0.7–1.3)
DIFFERENTIAL METHOD BLD: ABNORMAL
EOSINOPHIL # BLD: 0.1 K/UL (ref 0–0.4)
EOSINOPHIL NFR BLD: 1 % (ref 0–7)
ERYTHROCYTE [DISTWIDTH] IN BLOOD BY AUTOMATED COUNT: 14.3 % (ref 11.5–14.5)
GLOBULIN SER CALC-MCNC: 3.4 G/DL (ref 2–4)
GLUCOSE SERPL-MCNC: 84 MG/DL (ref 65–100)
HCT VFR BLD AUTO: 35.7 % (ref 36.6–50.3)
HDLC SERPL-MCNC: 59 MG/DL
HDLC SERPL: 3.2 (ref 0–5)
HGB BLD-MCNC: 12.2 G/DL (ref 12.1–17)
IMM GRANULOCYTES # BLD AUTO: 0.1 K/UL (ref 0–0.04)
IMM GRANULOCYTES NFR BLD AUTO: 2 % (ref 0–0.5)
LDLC SERPL CALC-MCNC: 100.2 MG/DL (ref 0–100)
LYMPHOCYTES # BLD: 1.3 K/UL (ref 0.8–3.5)
LYMPHOCYTES NFR BLD: 19 % (ref 12–49)
MCH RBC QN AUTO: 30.1 PG (ref 26–34)
MCHC RBC AUTO-ENTMCNC: 34.2 G/DL (ref 30–36.5)
MCV RBC AUTO: 88.1 FL (ref 80–99)
MONOCYTES # BLD: 0.7 K/UL (ref 0–1)
MONOCYTES NFR BLD: 10 % (ref 5–13)
NEUTS SEG # BLD: 4.8 K/UL (ref 1.8–8)
NEUTS SEG NFR BLD: 67 % (ref 32–75)
NRBC # BLD: 0.19 K/UL (ref 0–0.01)
NRBC BLD-RTO: 2.7 PER 100 WBC
PLATELET # BLD AUTO: 285 K/UL (ref 150–400)
PLATELET COMMENT: ABNORMAL
PMV BLD AUTO: 9.9 FL (ref 8.9–12.9)
POTASSIUM SERPL-SCNC: 4.2 MMOL/L (ref 3.5–5.1)
PROT SERPL-MCNC: 7.8 G/DL (ref 6.4–8.2)
PSA SERPL-MCNC: 1.3 NG/ML (ref 0.01–4)
RBC # BLD AUTO: 4.05 M/UL (ref 4.1–5.7)
RBC MORPH BLD: ABNORMAL
RBC MORPH BLD: ABNORMAL
SODIUM SERPL-SCNC: 132 MMOL/L (ref 136–145)
TRIGL SERPL-MCNC: 154 MG/DL
VLDLC SERPL CALC-MCNC: 30.8 MG/DL
WBC # BLD AUTO: 7.1 K/UL (ref 4.1–11.1)

## 2023-10-30 PROCEDURE — 99214 OFFICE O/P EST MOD 30 MIN: CPT | Performed by: INTERNAL MEDICINE

## 2023-10-30 PROCEDURE — 3079F DIAST BP 80-89 MM HG: CPT | Performed by: INTERNAL MEDICINE

## 2023-10-30 PROCEDURE — 3074F SYST BP LT 130 MM HG: CPT | Performed by: INTERNAL MEDICINE

## 2023-10-30 RX ORDER — TRIAMCINOLONE ACETONIDE 1 MG/G
CREAM TOPICAL
Qty: 45 G | Refills: 1 | Status: SHIPPED | OUTPATIENT
Start: 2023-10-30

## 2023-10-30 SDOH — ECONOMIC STABILITY: FOOD INSECURITY: WITHIN THE PAST 12 MONTHS, THE FOOD YOU BOUGHT JUST DIDN'T LAST AND YOU DIDN'T HAVE MONEY TO GET MORE.: NEVER TRUE

## 2023-10-30 SDOH — ECONOMIC STABILITY: INCOME INSECURITY: HOW HARD IS IT FOR YOU TO PAY FOR THE VERY BASICS LIKE FOOD, HOUSING, MEDICAL CARE, AND HEATING?: NOT HARD AT ALL

## 2023-10-30 SDOH — ECONOMIC STABILITY: FOOD INSECURITY: WITHIN THE PAST 12 MONTHS, YOU WORRIED THAT YOUR FOOD WOULD RUN OUT BEFORE YOU GOT MONEY TO BUY MORE.: NEVER TRUE

## 2023-10-30 ASSESSMENT — PATIENT HEALTH QUESTIONNAIRE - PHQ9
1. LITTLE INTEREST OR PLEASURE IN DOING THINGS: 0
SUM OF ALL RESPONSES TO PHQ9 QUESTIONS 1 & 2: 0
2. FEELING DOWN, DEPRESSED OR HOPELESS: 0
SUM OF ALL RESPONSES TO PHQ QUESTIONS 1-9: 0

## 2023-10-30 ASSESSMENT — ENCOUNTER SYMPTOMS
CONSTIPATION: 0
FACIAL SWELLING: 0
SHORTNESS OF BREATH: 0
COLOR CHANGE: 0
SORE THROAT: 0
EYE ITCHING: 0
EYE DISCHARGE: 0
EYE PAIN: 0
WHEEZING: 0
BACK PAIN: 0
ROS SKIN COMMENTS: ECZEMATOUS PATCHES
DIARRHEA: 0
ABDOMINAL PAIN: 0

## 2023-11-01 LAB — TESTOST SERPL-MCNC: 355 NG/DL (ref 264–916)

## 2023-11-03 DIAGNOSIS — N17.9 AKI (ACUTE KIDNEY INJURY) (HCC): Primary | ICD-10-CM

## 2023-11-03 DIAGNOSIS — E78.5 HYPERLIPIDEMIA, UNSPECIFIED: ICD-10-CM

## 2023-11-03 RX ORDER — SIMVASTATIN 20 MG
20 TABLET ORAL DAILY
Qty: 90 TABLET | Refills: 1 | Status: SHIPPED | OUTPATIENT
Start: 2023-11-03

## 2023-12-24 DIAGNOSIS — I10 ESSENTIAL (PRIMARY) HYPERTENSION: ICD-10-CM

## 2023-12-26 RX ORDER — CARVEDILOL 12.5 MG/1
TABLET ORAL
Qty: 180 TABLET | Refills: 1 | Status: SHIPPED | OUTPATIENT
Start: 2023-12-26

## 2023-12-26 RX ORDER — OLMESARTAN MEDOXOMIL 40 MG/1
TABLET ORAL
Qty: 90 TABLET | Refills: 1 | Status: SHIPPED | OUTPATIENT
Start: 2023-12-26

## 2024-01-21 DIAGNOSIS — E29.1 HYPOGONADISM IN MALE: ICD-10-CM

## 2024-01-22 RX ORDER — TESTOSTERONE 16.2 MG/G
GEL TRANSDERMAL
Qty: 75 G | Refills: 1 | Status: SHIPPED | OUTPATIENT
Start: 2024-01-22 | End: 2024-02-22

## 2024-02-29 ENCOUNTER — OFFICE VISIT (OUTPATIENT)
Age: 54
End: 2024-02-29
Payer: COMMERCIAL

## 2024-02-29 VITALS
DIASTOLIC BLOOD PRESSURE: 78 MMHG | HEIGHT: 73 IN | WEIGHT: 222 LBS | TEMPERATURE: 98.4 F | BODY MASS INDEX: 29.42 KG/M2 | RESPIRATION RATE: 16 BRPM | HEART RATE: 77 BPM | SYSTOLIC BLOOD PRESSURE: 130 MMHG | OXYGEN SATURATION: 98 %

## 2024-02-29 DIAGNOSIS — F43.22 ADJUSTMENT DISORDER WITH ANXIETY: Primary | ICD-10-CM

## 2024-02-29 DIAGNOSIS — F51.02 ADJUSTMENT INSOMNIA: ICD-10-CM

## 2024-02-29 DIAGNOSIS — I10 HYPERTENSION, UNSPECIFIED TYPE: ICD-10-CM

## 2024-02-29 DIAGNOSIS — R16.1 SPLENOMEGALY, NOT ELSEWHERE CLASSIFIED: ICD-10-CM

## 2024-02-29 PROCEDURE — 3078F DIAST BP <80 MM HG: CPT | Performed by: NURSE PRACTITIONER

## 2024-02-29 PROCEDURE — 99214 OFFICE O/P EST MOD 30 MIN: CPT | Performed by: NURSE PRACTITIONER

## 2024-02-29 PROCEDURE — 3075F SYST BP GE 130 - 139MM HG: CPT | Performed by: NURSE PRACTITIONER

## 2024-02-29 ASSESSMENT — PATIENT HEALTH QUESTIONNAIRE - PHQ9
SUM OF ALL RESPONSES TO PHQ QUESTIONS 1-9: 0
1. LITTLE INTEREST OR PLEASURE IN DOING THINGS: 0
2. FEELING DOWN, DEPRESSED OR HOPELESS: 0
SUM OF ALL RESPONSES TO PHQ QUESTIONS 1-9: 0
SUM OF ALL RESPONSES TO PHQ9 QUESTIONS 1 & 2: 0

## 2024-02-29 ASSESSMENT — ENCOUNTER SYMPTOMS
EYE PAIN: 0
DIARRHEA: 0
BLOOD IN STOOL: 0
VOMITING: 0
GASTROINTESTINAL NEGATIVE: 1
BACK PAIN: 0
COUGH: 0
ABDOMINAL PAIN: 0
CONSTIPATION: 0
RESPIRATORY NEGATIVE: 1
SHORTNESS OF BREATH: 0
NAUSEA: 0
SINUS PRESSURE: 0
SINUS PAIN: 0
EYES NEGATIVE: 1
CHEST TIGHTNESS: 0
RHINORRHEA: 0
EYE REDNESS: 0

## 2024-02-29 NOTE — PROGRESS NOTES
type    Abnormal finding on MRI of brain    History of nephrolithiasis    Idiopathic chronic venous hypertension of right lower extremity with ulcer (HCC)    Non-pressure chronic ulcer of right ankle with fat layer exposed (HCC)    Lung nodule     Past Surgical History:   Procedure Laterality Date    APPENDECTOMY  1985    HERNIA REPAIR      UROLOGICAL SURGERY  2000    testicular cancer surgery    VASCULAR SURGERY Left 2015      Social History     Tobacco Use    Smoking status: Former     Current packs/day: 0.00     Types: Cigarettes     Quit date:      Years since quittin.1    Smokeless tobacco: Never   Substance Use Topics    Alcohol use: Yes      Family History   Problem Relation Age of Onset    Hypertension Mother     Hypertension Father     Diabetes Brother         Physical Exam   Vitals:       /78 (Site: Left Upper Arm, Position: Sitting, Cuff Size: Small Adult)   Pulse 77   Temp 98.4 °F (36.9 °C) (Oral)   Resp 16   Ht 1.854 m (6' 1\")   Wt 100.7 kg (222 lb)   SpO2 98%   BMI 29.29 kg/m²      Physical Exam  Vitals reviewed.   Constitutional:       General: He is not in acute distress.     Appearance: Normal appearance. He is not ill-appearing.   HENT:      Head: Normocephalic and atraumatic.   Cardiovascular:      Rate and Rhythm: Normal rate and regular rhythm.      Pulses: Normal pulses.      Heart sounds: Normal heart sounds. No murmur heard.     No friction rub. No gallop.   Pulmonary:      Effort: Pulmonary effort is normal.      Breath sounds: Normal breath sounds.   Abdominal:      General: Bowel sounds are normal. There is no distension.      Palpations: Abdomen is soft. There is no mass.      Tenderness: There is no abdominal tenderness. There is no right CVA tenderness or left CVA tenderness.   Musculoskeletal:      Right lower leg: No edema.      Left lower leg: No edema.   Skin:     General: Skin is warm and dry.   Neurological:      Mental Status: He is alert and oriented to

## 2024-03-21 ENCOUNTER — HOSPITAL ENCOUNTER (OUTPATIENT)
Facility: HOSPITAL | Age: 54
Discharge: HOME | End: 2024-03-21
Payer: COMMERCIAL

## 2024-03-21 DIAGNOSIS — R16.1 SPLENOMEGALY, NOT ELSEWHERE CLASSIFIED: ICD-10-CM

## 2024-03-21 LAB — CREAT BLD-MCNC: 1.4 MG/DL (ref 0.6–1.3)

## 2024-03-21 PROCEDURE — 74160 CT ABDOMEN W/CONTRAST: CPT

## 2024-03-21 PROCEDURE — 6360000004 HC RX CONTRAST MEDICATION: Performed by: NURSE PRACTITIONER

## 2024-03-21 PROCEDURE — 82565 ASSAY OF CREATININE: CPT

## 2024-03-21 RX ADMIN — IOPAMIDOL 100 ML: 755 INJECTION, SOLUTION INTRAVENOUS at 08:06

## 2024-03-23 DIAGNOSIS — E78.5 HYPERLIPIDEMIA, UNSPECIFIED: ICD-10-CM

## 2024-03-24 DIAGNOSIS — I10 ESSENTIAL (PRIMARY) HYPERTENSION: ICD-10-CM

## 2024-03-25 RX ORDER — CHLORTHALIDONE 25 MG/1
25 TABLET ORAL EVERY MORNING
Qty: 90 TABLET | Refills: 1 | Status: SHIPPED | OUTPATIENT
Start: 2024-03-25

## 2024-03-25 RX ORDER — SIMVASTATIN 20 MG
20 TABLET ORAL DAILY
Qty: 90 TABLET | Refills: 1 | Status: SHIPPED | OUTPATIENT
Start: 2024-03-25

## 2024-04-03 DIAGNOSIS — E29.1 HYPOGONADISM IN MALE: ICD-10-CM

## 2024-04-03 RX ORDER — TESTOSTERONE 16.2 MG/G
GEL TRANSDERMAL
Qty: 75 G | Refills: 1 | Status: SHIPPED | OUTPATIENT
Start: 2024-04-03 | End: 2024-05-02

## 2024-06-08 DIAGNOSIS — E29.1 HYPOGONADISM IN MALE: ICD-10-CM

## 2024-06-10 RX ORDER — TESTOSTERONE 16.2 MG/G
GEL TRANSDERMAL
Qty: 75 G | Refills: 1 | Status: SHIPPED | OUTPATIENT
Start: 2024-06-10 | End: 2024-07-10

## 2024-06-21 DIAGNOSIS — I10 ESSENTIAL (PRIMARY) HYPERTENSION: ICD-10-CM

## 2024-06-21 RX ORDER — OLMESARTAN MEDOXOMIL 40 MG/1
TABLET ORAL
Qty: 90 TABLET | Refills: 1 | Status: SHIPPED | OUTPATIENT
Start: 2024-06-21

## 2024-06-21 RX ORDER — CARVEDILOL 12.5 MG/1
TABLET ORAL
Qty: 180 TABLET | Refills: 1 | Status: SHIPPED | OUTPATIENT
Start: 2024-06-21

## 2024-09-16 DIAGNOSIS — E78.5 HYPERLIPIDEMIA, UNSPECIFIED: ICD-10-CM

## 2024-09-16 DIAGNOSIS — I10 ESSENTIAL (PRIMARY) HYPERTENSION: ICD-10-CM

## 2024-09-16 RX ORDER — SIMVASTATIN 20 MG
20 TABLET ORAL DAILY
Qty: 90 TABLET | Refills: 1 | Status: SHIPPED | OUTPATIENT
Start: 2024-09-16

## 2024-09-16 RX ORDER — CHLORTHALIDONE 25 MG/1
25 TABLET ORAL EVERY MORNING
Qty: 90 TABLET | Refills: 1 | Status: SHIPPED | OUTPATIENT
Start: 2024-09-16

## 2024-10-09 DIAGNOSIS — I10 ESSENTIAL (PRIMARY) HYPERTENSION: ICD-10-CM

## 2024-10-09 RX ORDER — OLMESARTAN MEDOXOMIL 40 MG/1
TABLET ORAL
Qty: 90 TABLET | Refills: 1 | Status: SHIPPED | OUTPATIENT
Start: 2024-10-09

## 2024-10-09 RX ORDER — CARVEDILOL 12.5 MG/1
TABLET ORAL
Qty: 180 TABLET | Refills: 1 | Status: SHIPPED | OUTPATIENT
Start: 2024-10-09

## 2025-03-09 DIAGNOSIS — E78.5 HYPERLIPIDEMIA, UNSPECIFIED: ICD-10-CM

## 2025-03-09 DIAGNOSIS — I10 ESSENTIAL (PRIMARY) HYPERTENSION: ICD-10-CM

## 2025-03-10 RX ORDER — CHLORTHALIDONE 25 MG/1
25 TABLET ORAL EVERY MORNING
Qty: 90 TABLET | Refills: 1 | Status: SHIPPED | OUTPATIENT
Start: 2025-03-10

## 2025-03-10 RX ORDER — SIMVASTATIN 20 MG
20 TABLET ORAL DAILY
Qty: 90 TABLET | Refills: 1 | Status: SHIPPED | OUTPATIENT
Start: 2025-03-10

## 2025-06-04 DIAGNOSIS — I10 ESSENTIAL (PRIMARY) HYPERTENSION: ICD-10-CM

## 2025-06-04 RX ORDER — CARVEDILOL 12.5 MG/1
12.5 TABLET ORAL 2 TIMES DAILY
Qty: 180 TABLET | Refills: 1 | Status: SHIPPED | OUTPATIENT
Start: 2025-06-04

## 2025-06-04 RX ORDER — OLMESARTAN MEDOXOMIL 40 MG/1
40 TABLET ORAL DAILY
Qty: 90 TABLET | Refills: 1 | Status: SHIPPED | OUTPATIENT
Start: 2025-06-04

## 2025-09-01 DIAGNOSIS — E78.5 HYPERLIPIDEMIA, UNSPECIFIED: ICD-10-CM

## 2025-09-01 DIAGNOSIS — I10 ESSENTIAL (PRIMARY) HYPERTENSION: ICD-10-CM

## 2025-09-02 RX ORDER — CHLORTHALIDONE 25 MG/1
25 TABLET ORAL EVERY MORNING
Qty: 90 TABLET | Refills: 1 | Status: SHIPPED | OUTPATIENT
Start: 2025-09-02

## 2025-09-02 RX ORDER — SIMVASTATIN 20 MG
20 TABLET ORAL DAILY
Qty: 90 TABLET | Refills: 1 | Status: SHIPPED | OUTPATIENT
Start: 2025-09-02